# Patient Record
Sex: MALE | Race: OTHER | HISPANIC OR LATINO | URBAN - METROPOLITAN AREA
[De-identification: names, ages, dates, MRNs, and addresses within clinical notes are randomized per-mention and may not be internally consistent; named-entity substitution may affect disease eponyms.]

---

## 2021-12-08 ENCOUNTER — EMERGENCY (EMERGENCY)
Facility: HOSPITAL | Age: 56
LOS: 1 days | Discharge: HOME | End: 2021-12-08
Attending: EMERGENCY MEDICINE
Payer: MEDICAID

## 2021-12-08 VITALS
HEART RATE: 95 BPM | OXYGEN SATURATION: 95 % | DIASTOLIC BLOOD PRESSURE: 86 MMHG | WEIGHT: 190.04 LBS | TEMPERATURE: 98 F | SYSTOLIC BLOOD PRESSURE: 126 MMHG | RESPIRATION RATE: 20 BRPM

## 2021-12-08 DIAGNOSIS — T78.3XXA ANGIONEUROTIC EDEMA, INITIAL ENCOUNTER: ICD-10-CM

## 2021-12-08 LAB
ALBUMIN SERPL ELPH-MCNC: 4.7 G/DL — SIGNIFICANT CHANGE UP (ref 3.3–5)
ALP SERPL-CCNC: 103 U/L — SIGNIFICANT CHANGE UP (ref 40–120)
ALT FLD-CCNC: 17 U/L — SIGNIFICANT CHANGE UP (ref 10–45)
ANION GAP SERPL CALC-SCNC: 11 MMOL/L — SIGNIFICANT CHANGE UP (ref 5–17)
AST SERPL-CCNC: 19 U/L — SIGNIFICANT CHANGE UP (ref 10–40)
BASOPHILS # BLD AUTO: 0.02 K/UL — SIGNIFICANT CHANGE UP (ref 0–0.2)
BASOPHILS NFR BLD AUTO: 0.3 % — SIGNIFICANT CHANGE UP (ref 0–2)
BILIRUB SERPL-MCNC: 0.4 MG/DL — SIGNIFICANT CHANGE UP (ref 0.2–1.2)
BUN SERPL-MCNC: 17 MG/DL — SIGNIFICANT CHANGE UP (ref 7–23)
CALCIUM SERPL-MCNC: 9.3 MG/DL — SIGNIFICANT CHANGE UP (ref 8.4–10.5)
CHLORIDE SERPL-SCNC: 102 MMOL/L — SIGNIFICANT CHANGE UP (ref 96–108)
CO2 SERPL-SCNC: 24 MMOL/L — SIGNIFICANT CHANGE UP (ref 22–31)
CREAT SERPL-MCNC: 1.02 MG/DL — SIGNIFICANT CHANGE UP (ref 0.5–1.3)
EOSINOPHIL # BLD AUTO: 0.06 K/UL — SIGNIFICANT CHANGE UP (ref 0–0.5)
EOSINOPHIL NFR BLD AUTO: 1 % — SIGNIFICANT CHANGE UP (ref 0–6)
GLUCOSE SERPL-MCNC: 116 MG/DL — HIGH (ref 70–99)
HCT VFR BLD CALC: 46.1 % — SIGNIFICANT CHANGE UP (ref 39–50)
HGB BLD-MCNC: 14.8 G/DL — SIGNIFICANT CHANGE UP (ref 13–17)
IMM GRANULOCYTES NFR BLD AUTO: 0.3 % — SIGNIFICANT CHANGE UP (ref 0–1.5)
LYMPHOCYTES # BLD AUTO: 2.33 K/UL — SIGNIFICANT CHANGE UP (ref 1–3.3)
LYMPHOCYTES # BLD AUTO: 40 % — SIGNIFICANT CHANGE UP (ref 13–44)
MCHC RBC-ENTMCNC: 27.7 PG — SIGNIFICANT CHANGE UP (ref 27–34)
MCHC RBC-ENTMCNC: 32.1 GM/DL — SIGNIFICANT CHANGE UP (ref 32–36)
MCV RBC AUTO: 86.3 FL — SIGNIFICANT CHANGE UP (ref 80–100)
MONOCYTES # BLD AUTO: 0.44 K/UL — SIGNIFICANT CHANGE UP (ref 0–0.9)
MONOCYTES NFR BLD AUTO: 7.5 % — SIGNIFICANT CHANGE UP (ref 2–14)
NEUTROPHILS # BLD AUTO: 2.96 K/UL — SIGNIFICANT CHANGE UP (ref 1.8–7.4)
NEUTROPHILS NFR BLD AUTO: 50.9 % — SIGNIFICANT CHANGE UP (ref 43–77)
NRBC # BLD: 0 /100 WBCS — SIGNIFICANT CHANGE UP (ref 0–0)
PLATELET # BLD AUTO: 317 K/UL — SIGNIFICANT CHANGE UP (ref 150–400)
POTASSIUM SERPL-MCNC: 4 MMOL/L — SIGNIFICANT CHANGE UP (ref 3.5–5.3)
POTASSIUM SERPL-SCNC: 4 MMOL/L — SIGNIFICANT CHANGE UP (ref 3.5–5.3)
PROT SERPL-MCNC: 7.9 G/DL — SIGNIFICANT CHANGE UP (ref 6–8.3)
RBC # BLD: 5.34 M/UL — SIGNIFICANT CHANGE UP (ref 4.2–5.8)
RBC # FLD: 14.3 % — SIGNIFICANT CHANGE UP (ref 10.3–14.5)
SARS-COV-2 RNA SPEC QL NAA+PROBE: SIGNIFICANT CHANGE UP
SODIUM SERPL-SCNC: 137 MMOL/L — SIGNIFICANT CHANGE UP (ref 135–145)
WBC # BLD: 5.83 K/UL — SIGNIFICANT CHANGE UP (ref 3.8–10.5)
WBC # FLD AUTO: 5.83 K/UL — SIGNIFICANT CHANGE UP (ref 3.8–10.5)

## 2021-12-08 PROCEDURE — 99284 EMERGENCY DEPT VISIT MOD MDM: CPT | Mod: 25

## 2021-12-08 PROCEDURE — 31575 DIAGNOSTIC LARYNGOSCOPY: CPT

## 2021-12-08 PROCEDURE — 99291 CRITICAL CARE FIRST HOUR: CPT

## 2021-12-08 RX ORDER — DIPHENHYDRAMINE HCL 50 MG
50 CAPSULE ORAL ONCE
Refills: 0 | Status: COMPLETED | OUTPATIENT
Start: 2021-12-08 | End: 2021-12-08

## 2021-12-08 RX ORDER — EPINEPHRINE 0.3 MG/.3ML
0.5 INJECTION INTRAMUSCULAR; SUBCUTANEOUS ONCE
Refills: 0 | Status: COMPLETED | OUTPATIENT
Start: 2021-12-08 | End: 2021-12-08

## 2021-12-08 RX ORDER — FAMOTIDINE 10 MG/ML
20 INJECTION INTRAVENOUS
Refills: 0 | Status: DISCONTINUED | OUTPATIENT
Start: 2021-12-08 | End: 2021-12-08

## 2021-12-08 RX ORDER — DIPHENHYDRAMINE HCL 50 MG
50 CAPSULE ORAL EVERY 6 HOURS
Refills: 0 | Status: DISCONTINUED | OUTPATIENT
Start: 2021-12-08 | End: 2021-12-11

## 2021-12-08 RX ORDER — FAMOTIDINE 10 MG/ML
20 INJECTION INTRAVENOUS
Refills: 0 | Status: DISCONTINUED | OUTPATIENT
Start: 2021-12-08 | End: 2021-12-11

## 2021-12-08 RX ORDER — ACETAMINOPHEN 500 MG
975 TABLET ORAL ONCE
Refills: 0 | Status: COMPLETED | OUTPATIENT
Start: 2021-12-08 | End: 2021-12-08

## 2021-12-08 RX ORDER — EPINEPHRINE 0.3 MG/.3ML
0.3 INJECTION INTRAMUSCULAR; SUBCUTANEOUS
Qty: 2 | Refills: 0
Start: 2021-12-08

## 2021-12-08 RX ORDER — DEXAMETHASONE 0.5 MG/5ML
10 ELIXIR ORAL EVERY 8 HOURS
Refills: 0 | Status: DISCONTINUED | OUTPATIENT
Start: 2021-12-08 | End: 2021-12-09

## 2021-12-08 RX ORDER — DEXAMETHASONE 0.5 MG/5ML
10 ELIXIR ORAL ONCE
Refills: 0 | Status: COMPLETED | OUTPATIENT
Start: 2021-12-08 | End: 2021-12-08

## 2021-12-08 RX ORDER — FAMOTIDINE 10 MG/ML
20 INJECTION INTRAVENOUS ONCE
Refills: 0 | Status: COMPLETED | OUTPATIENT
Start: 2021-12-08 | End: 2021-12-08

## 2021-12-08 RX ADMIN — Medication 975 MILLIGRAM(S): at 19:54

## 2021-12-08 RX ADMIN — EPINEPHRINE 0.5 MILLIGRAM(S): 0.3 INJECTION INTRAMUSCULAR; SUBCUTANEOUS at 10:56

## 2021-12-08 RX ADMIN — FAMOTIDINE 20 MILLIGRAM(S): 10 INJECTION INTRAVENOUS at 23:23

## 2021-12-08 RX ADMIN — FAMOTIDINE 20 MILLIGRAM(S): 10 INJECTION INTRAVENOUS at 10:55

## 2021-12-08 RX ADMIN — Medication 50 MILLIGRAM(S): at 19:55

## 2021-12-08 RX ADMIN — Medication 125 MILLIGRAM(S): at 09:47

## 2021-12-08 RX ADMIN — Medication 102 MILLIGRAM(S): at 23:23

## 2021-12-08 RX ADMIN — EPINEPHRINE 0.5 MILLIGRAM(S): 0.3 INJECTION INTRAMUSCULAR; SUBCUTANEOUS at 10:08

## 2021-12-08 RX ADMIN — Medication 50 MILLIGRAM(S): at 09:47

## 2021-12-08 RX ADMIN — Medication 102 MILLIGRAM(S): at 15:21

## 2021-12-08 RX ADMIN — EPINEPHRINE 0.5 MILLIGRAM(S): 0.3 INJECTION INTRAMUSCULAR; SUBCUTANEOUS at 09:46

## 2021-12-08 RX ADMIN — Medication 975 MILLIGRAM(S): at 20:21

## 2021-12-08 RX ADMIN — Medication 102 MILLIGRAM(S): at 10:45

## 2021-12-08 NOTE — ED CDU PROVIDER INITIAL DAY NOTE - PROGRESS NOTE DETAILS
No interval changes since initial CDU provider note. Pt feels well without complaint. NAD VSS. Patient endorse improvement in symptoms. Able to tolerate water. Lips and tongue does not appear to be swollen. Lung clear to auscultation, abdomen soft/nontender. O2 sat mid 90s. Will continue to monitor closely.. Michelet Lee

## 2021-12-08 NOTE — ED PROVIDER NOTE - PROGRESS NOTE DETAILS
Patient given second dose of IM epi for throat closing and itching.  Will admit patient to CDU for continued monitoring given repeat dose of Epi required.   Aurea Roberts MD dw ent pa, will come to scope pt promptly. -Ricky Giles MD-

## 2021-12-08 NOTE — CONSULT NOTE ADULT - SUBJECTIVE AND OBJECTIVE BOX
CC: throat swelling     HPI: 57 yo M pw allergic reaction that started after drinking some soy milk today. He endorses facial swelling, throat closing and itching, difficulty breathing and hives and itching to palms. Denies any prior anaphylaxis or allergies. Denies fever, wheezing, cough, nausea, vomiting. Patient takes no medications at home.      PAST MEDICAL & SURGICAL HISTORY:  No pertinent past medical history    No significant past surgical history      Allergies    No Known Drug Allergies  Soy (Anaphylaxis)    Intolerances      MEDICATIONS  (STANDING):    MEDICATIONS  (PRN):      Social History: no pertinent social history     Family history: no pertinent family history    ROS:   ENT: all negative except as noted in HPI   CV: denies palpitations  Pulm: denies SOB, cough, hemoptysis  GI: denies change in appetite, indigestion, n/v  : denies pertinent urinary symptoms, urgency  Neuro: denies numbness/tingling, loss of sensation  Psych: denies anxiety  MS: denies muscle weakness, instability  Heme: denies easy bruising or bleeding  Endo: denies heat/cold intolerance, excessive sweating  Vascular: denies LE edema    Vital Signs Last 24 Hrs  T(C): 36.6 (08 Dec 2021 11:36), Max: 36.7 (08 Dec 2021 09:48)  T(F): 97.8 (08 Dec 2021 11:36), Max: 98 (08 Dec 2021 09:48)  HR: 96 (08 Dec 2021 11:36) (95 - 105)  BP: 119/76 (08 Dec 2021 11:36) (119/76 - 140/80)  BP(mean): --  RR: 22 (08 Dec 2021 11:36) (20 - 22)  SpO2: 99% (08 Dec 2021 11:36) (95% - 99%)                          14.8   5.83  )-----------( 317      ( 08 Dec 2021 10:10 )             46.1    12-08    137  |  102  |  17  ----------------------------<  116<H>  4.0   |  24  |  1.02    Ca    9.3      08 Dec 2021 10:10    TPro  7.9  /  Alb  4.7  /  TBili  0.4  /  DBili  x   /  AST  19  /  ALT  17  /  AlkPhos  103  12-08       PHYSICAL EXAM:  Gen: NAD, muffled voice   Skin: No rashes, bruises, or lesions  Head: Normocephalic, Atraumatic  Face: no edema, erythema, or fluctuance. Parotid glands soft without mass  Eyes: no scleral injection  Nose: Nares bilaterally patent, no discharge  Mouth: +significant uvular edema. +soft palate edema, +FOM edema. No Stridor / Drooling / Trismus. Mucosa moist, oropharynx clear  Neck: Flat, supple, no lymphadenopathy, trachea midline, no masses  Lymphatic: No lymphadenopathy  Resp: breathing easily, no stridor  CV: no peripheral edema/cyanosis  GI: nondistended   Peripheral vascular: no JVD or edema  Neuro: facial nerve intact, no facial droop      Fiberoptic Indirect laryngoscopy:  (Scope #2 used)  Reason for Laryngoscopy: angioedema     Patient was unable to cooperate with mirror.  generalized erythema throughout larynx, pooling of secretions in oropharynx, +uvular edema. Nasopharynx, oropharynx, and hypopharynx clear, no bleeding. Tongue base, posterior pharyngeal wall, vallecula, epiglottis, and subglottis appear normal. No masses or lesions. Airway patent, no foreign body visualized. No glottic/supraglottic edema. True vocal cords, arytenoids, vestibular folds, ventricles, pyriform sinuses, and aryepiglottic folds appear normal bilaterally. Vocal cords mobile with good contact b/l.

## 2021-12-08 NOTE — ED CDU PROVIDER INITIAL DAY NOTE - PHYSICAL EXAMINATION
GENERAL: AAOx4, GCS 15, NAD; talking in full clear sentences. pt reports voice sounds "heavy"  HEENT: MMM, + periorbital and lip swelling, EOMI, nonicteric sclera;   PULM: CTAB, no crackles/rubs/rales;   CV: RRR, S1S2, no MRG;   ABD: Flat abdomen, NTND, no R/G/R   MSK: WOOD, +2 pulses x4;    SKIN: + hives to palms bilaterally   NEURO: moving all extremities; sensation intact to bilateral UE and LE

## 2021-12-08 NOTE — ED CDU PROVIDER INITIAL DAY NOTE - ATTENDING CONTRIBUTION TO CARE
ATTENDING, Chan SUAREZ: I have personally performed a face to face diagnostic evaluation on this patient.  I have reviewed the ACP note and agree with the history, exam, and plan of care, except as noted here. Progress notes and further evaluation to be reviewed by observation and discharging attending.

## 2021-12-08 NOTE — ED CDU PROVIDER INITIAL DAY NOTE - OBJECTIVE STATEMENT
56yom no pmhx on no meds, No etoh, drug use, c/o allergic reaction that started after drinking some soy milk today around 745am then rash and throat closing sensation around 830ish. He endorses facial swelling, throat closing and itching, difficulty breathing and hives and itching to palms. Denies any prior anaphylaxis or allergies. Denies fever, wheezing, cough, nausea, vomiting. Patient takes no medications at home.

## 2021-12-08 NOTE — ED PROVIDER NOTE - PHYSICAL EXAMINATION
GENERAL: AAOx4, GCS 15, NAD, WDWN;   HEENT: MMM, + periorbital and lip swelling, EOMI, nonicteric sclera;   PULM: CTAB, no crackles/rubs/rales;   CV: RRR, S1S2, no MRG;   ABD: Flat abdomen, NTND, no R/G/R   MSK: WOOD, +2 pulses x4;    SKIN: + hives to palms bilaterally   NEURO: moving all extremities; sensation intact to bilateral UE and LE

## 2021-12-08 NOTE — CONSULT NOTE ADULT - ASSESSMENT
55 yo M pw allergic reaction that started after drinking some soy milk today. He endorses facial swelling, throat closing and itching, difficulty breathing and hives and itching to palms. On exam, significant uvular and soft palate edema noted. Indirect laryngoscopy performed which showed uvular edema consistent with exam however no laryngeal edema noted. Repeat scope 45 min later showed mild improvement.

## 2021-12-08 NOTE — CONSULT NOTE ADULT - ATTENDING COMMENTS
Pt with soft palate significant angioedema progressed over 1.5 hours with slight improvement subjectively since meds on first exam   scope showed clear airway and no other angioedema   serial exam/scope 45 min later showed no additional edema and subjective improvement in sx. will continue to monitor closely, low threshold to securer airway if sx or exam gets worse - discussed with pt  decadron, benadryl, pepcid  close monitoring, let us know if any change

## 2021-12-08 NOTE — CHART NOTE - NSCHARTNOTEFT_GEN_A_CORE
Pt seen and examined at bedside. Pt eating and tolerating regular diet. Pt didn't  have stridor, on RA, no SOB, hoarseness, and improvement of voice and swelling. He is able to swallowing with minimal difficulty, no drooling noted, able to handle secretions. On exam, improvement of soft palate and uvular edema. Also noted improvement of voice on exam.     Plan:  - continue decadron 10mg q8h x 24 hours  - continue benadryl and pepcid  - Monitor for signs of airway compromise, call ENT for worsening swelling or stridor  - ENT will continue to follow.    # [id number]  ENT PA.
Pt seen and examined at bedside. Pt eating and tolerating regular diet. Pt states improvement of voice and swelling. He is able to swallowing with minimal difficulty. On exam, improvement of soft palate and uvular edema. Also noted improvement of voice on exam.    Plan:  - continue decadron 10mg q8h x 24 hours  - continue benadryl and pepcid  - Monitor for signs of airway compromise, call ENT for worsening swelling or stridor  - ENT will continue to follow

## 2021-12-08 NOTE — CONSULT NOTE ADULT - PROBLEM SELECTOR RECOMMENDATION 9
- Decadron 10mg q8h x24 hours  - Benedryl  - Pepcid   - Airway monitoring  - CDU vs admission depending on improvement of edema   - Plan to rescope   - ENT will continue to follow  - Call with questions or concerns

## 2021-12-08 NOTE — ED ADULT NURSE NOTE - OBJECTIVE STATEMENT
57 y/o male from home coming to the ED for trouble breathing, throat swelling and itching after drinking soy milk this morning. Pt denies PMH, states he is allergic to some fruits, never had an anaphylactic reaction from soy milk previously, pt denies CP, abd pain fever/chills/n/v/d,  Pt is A&Ox4, some labored breathing and SOB noted by RN, pt abd soft no tender, skin warm dry and intact.  Tongue swelling noted, will continue to monitor, pt given call bell, safety and comfort provided.

## 2021-12-08 NOTE — ED PROVIDER NOTE - OBJECTIVE STATEMENT
57 yo M pw allergic reaction that started after drinking some soy milk today. He endorses facial swelling, throat closing and itching, difficulty breathing and hives and itching to palms. Denies any prior anaphylaxis or allergies. Denies fever, wheezing, cough, nausea, vomiting. Patient takes no medications at home.

## 2021-12-08 NOTE — ED PROVIDER NOTE - ATTENDING CONTRIBUTION TO CARE
Evaluating for critical conditions noted above. Ordering tests. Reviewing results. Ordering medications as noted in MAR. Discussions with consultant.     sob / throat closing / itch from poss soy milk. no hx of such. rash to friuts.  periorbital edema, muffled voice  epi / steriods / re-eval.

## 2021-12-09 VITALS
TEMPERATURE: 98 F | HEART RATE: 46 BPM | RESPIRATION RATE: 20 BRPM | SYSTOLIC BLOOD PRESSURE: 118 MMHG | OXYGEN SATURATION: 97 % | DIASTOLIC BLOOD PRESSURE: 62 MMHG

## 2021-12-09 DIAGNOSIS — G47.33 OBSTRUCTIVE SLEEP APNEA (ADULT) (PEDIATRIC): ICD-10-CM

## 2021-12-09 PROCEDURE — 85025 COMPLETE CBC W/AUTO DIFF WBC: CPT

## 2021-12-09 PROCEDURE — U0003: CPT

## 2021-12-09 PROCEDURE — 96376 TX/PRO/DX INJ SAME DRUG ADON: CPT

## 2021-12-09 PROCEDURE — 80053 COMPREHEN METABOLIC PANEL: CPT

## 2021-12-09 PROCEDURE — G0378: CPT

## 2021-12-09 PROCEDURE — 96372 THER/PROPH/DIAG INJ SC/IM: CPT | Mod: XU

## 2021-12-09 PROCEDURE — U0005: CPT

## 2021-12-09 PROCEDURE — 96374 THER/PROPH/DIAG INJ IV PUSH: CPT | Mod: XU

## 2021-12-09 PROCEDURE — 96375 TX/PRO/DX INJ NEW DRUG ADDON: CPT | Mod: XU

## 2021-12-09 PROCEDURE — 31505 DIAGNOSTIC LARYNGOSCOPY: CPT

## 2021-12-09 PROCEDURE — 99291 CRITICAL CARE FIRST HOUR: CPT | Mod: 25

## 2021-12-09 RX ORDER — KETOROLAC TROMETHAMINE 30 MG/ML
15 SYRINGE (ML) INJECTION ONCE
Refills: 0 | Status: DISCONTINUED | OUTPATIENT
Start: 2021-12-09 | End: 2021-12-09

## 2021-12-09 RX ADMIN — Medication 50 MILLIGRAM(S): at 03:32

## 2021-12-09 RX ADMIN — Medication 15 MILLIGRAM(S): at 05:41

## 2021-12-09 NOTE — ED CDU PROVIDER SUBSEQUENT DAY NOTE - PROGRESS NOTE DETAILS
O2 sat while sleeping fluctuates from mid 90s to mid-high 80s. Patient was sleeping on stomach at the time of desat. Denies SOB. No signs of acute respiratory distress. No lip/tongue/facial swelling. Placed on 1L NC. Unsure if there is a component of undiagnosed sleep apnea or positioning. Will monitor closely. - Shruti Lee PA-C O2 sat while sleeping fluctuates from mid 90s to mid-high 80s. Patient was sleeping on stomach at the time of desat. Denies SOB. No signs of acute respiratory distress, patient speaking in complete sentences. Without complaints. No lip/tongue/facial swelling. Placed on 1L NC. Unsure if there is a component of undiagnosed sleep apnea or positioning. Will monitor closely. - Shruti Lee PA-C ENT at bedside, states that patient can be discharged home from their perspective to follow up with allergist. States that patient can also follow up with Dr. Stauffer. Would recommend discharge home with medrol dose pack. - Shruti Lee PA-C Pt seen at bedside in NAD, resting and breathing comfortably w/o new or evolving complaints. States feeling much better. Tolerating secretions. VSS. DEMARCO overnight. SpO2 now 98 on RA. ENT at bedside, cleared pt for DC. Pharmacy confirmed - will send steroid dose pack and epi pen. Pt understands to f/u pmd, ENT, pulmonology, allergy in 1 month.

## 2021-12-09 NOTE — ED CDU PROVIDER SUBSEQUENT DAY NOTE - HISTORY
No interval changes since initial CDU provider note. Pt without complaint. NAD VSS. no events on tele. Plan to continue to closely monitor symptoms in the setting of angioedema, resolving. ENT following. - ORIANA Lee

## 2021-12-09 NOTE — ED ADULT NURSE REASSESSMENT NOTE - NS ED NURSE REASSESS COMMENT FT1
07.00 Am Received the Pt from  JHONATHAN Ashby . Pt is Observed for Allergic reaction. Received the Pt A&OX 4 obeys commands Devi N/V/D fever chills cp SOB  Pt not in respiratory distress   Comfort care & safety measures continued  IV site looks clean & dry no signs of infiltration noted pt denies  pain IV site .  Pt is advised to call for help  call bell with in the reach pt verbalized the understanding .   pending CDU  MD west . GCS 15/15 A&OX 4 PERRLA  size 3 Strong upper & lower extremities steady gait   No facial droop  No Hand Leg drop denies numbness tingling Continue to monitor  09.32 Pt is evaluated by CDU MD Fatoumata Vazquez . pt is feeling better.  Pt is discharged . Ml bridget Power  explained the follow up care & gave the discharge summary  . Pt has stable vitals steady gait A&OX 4 at the time of Discharge
AAO3, NAD, RR even and unlabored, airway patent. Speaking clear in full sentences. CM ongoing
Assumed care of pt from RN Evelyn, AAO3, NAD. RR even and unlabored speaking clear in full sentences. Reports that throat still feels slightly tight but has greatly improved since arrival to ED
Pt received from JHONATHAN Martinez. Pt oriented to CDU & plan of care was discussed. Pt states he is feeling much better than previously. Pt denies any SOB, difficulty breathing, difficulty swallowing. Pt states all symptoms have resolved. Pt able to tolerate PO without difficulty. Pt placed on continuous pulse ox sating >95% on room air. Safety & comfort measures maintained. Call bell in reach. Will continue to monitor.

## 2021-12-09 NOTE — ED CDU PROVIDER DISPOSITION NOTE - CLINICAL COURSE
55 yo M pw allergic reaction that started after drinking some soy milk today. He endorses facial swelling, throat closing and itching, difficulty breathing and hives and itching to palms. Denies any prior anaphylaxis or allergies. Denies fever, wheezing, cough, nausea, vomiting. Patient takes no medications at home.  ED course: Patient given multiple rounds of Epi and IV steroids with improvement in symptoms. ENT consulted. Plan for close monitoring, Decadron/Benadryl/Pepcid for angioedema in CDU. 55 yo M pw allergic reaction that started after drinking some soy milk today. He endorses facial swelling, throat closing and itching, difficulty breathing and hives and itching to palms. Denies any prior anaphylaxis or allergies. Denies fever, wheezing, cough, nausea, vomiting. Patient takes no medications at home.  ED course: Patient given multiple rounds of Epi and IV steroids with improvement in symptoms. ENT consulted. Plan for close monitoring, Decadron/Benadryl/Pepcid for angioedema in CDU.  In CDU, Pt seen at bedside in NAD, resting and breathing comfortably w/o new or evolving complaints. States feeling much better. Tolerating secretions. VSS. DEMARCO overnight. SpO2 now 98 on RA. ENT at bedside, cleared pt for DC. Pharmacy confirmed - will send steroid dose pack and epi pen. Pt understands to f/u pmd, ENT, pulmonology, allergy in 1 month.

## 2021-12-09 NOTE — PROGRESS NOTE ADULT - PROBLEM SELECTOR PLAN 1
-DC w medrol dosepak  -Pt to return should edema return, difficulty breathing speaking or swallowing

## 2021-12-09 NOTE — ED CDU PROVIDER SUBSEQUENT DAY NOTE - ATTENDING CONTRIBUTION TO CARE
Pt observed overnight for angioedema.  Had some drop in sats improved with repositioning and oxygen 1L, now awake this morning with normal sats on room air.  Swelling improved, normal voice, no wheezing.  Advised to f/u sleep study, ENT, and allergist (1mo).  STable for dc home.

## 2021-12-09 NOTE — ED CDU PROVIDER DISPOSITION NOTE - NSFOLLOWUPINSTRUCTIONS_ED_ALL_ED_FT
Hydrate.     You have been prescribed an Epi-Pen, please read the attached information on use.     You may be contacted by our Emergency Department Referrals Coordinator to set up your follow up appointment within 24-48 hours of your discharge Monday- Friday: Allergy/Immunology. We recommend you follow up with your primary care provider within the next 2-3 days, please bring all of your results with you.     Please return to the Emergency Department with new, worsening, or concerning symptoms, such as:  -Shortness of breath or trouble breathing  -Pressure, pain, tightness in chest  -Facial drooping, arm weakness, or speech difficulty   -Head injury or loss of consciousness   -Nonstop bleeding or an open wound     *More detailed information regarding your visit and discharge can be found by reviewing this packet 1) Follow-up with your primary care provider in 1-2 days.        Follow-up with ENT within 1-2 weeks. Call for appointment.    Dakota Stauffer)  Otolaryngology  [address], [address]  [place] [address]  Phone: [phone]  Fax: [phone]        Follow-up with [facility] within 1-2 weeks. Call for appointment.    [facility]  [address], [place] [address]  Phone: [phone]        Follow-up with Allergy and Immunology in 4 weeks. Call for appointment.    [facility] Allergy and Immunology  Allergy  [address] [place] [address]  Phone: [phone]    2) Take Prednisone as prescribed. Use EpiPen if you experience severe allergic reaction with difficultly breathing. Continue to take all medications as prescribed.    3) Rest and drink plenty of fluids. Pain can be managed with Acetaminophen (aka Tylenol) and Ibuprofen (aka Motrin or Advil) over the counter as directed. Take with food.    4) Return to the ER for any new or worsening symptoms.

## 2021-12-09 NOTE — ED CDU PROVIDER DISPOSITION NOTE - PATIENT PORTAL LINK FT
You can access the FollowMyHealth Patient Portal offered by [facility] by registering at the following website: [email or web address]. By joining [facility]’s FollowMyHealth portal, you will also be able to view your health information using other applications (apps) compatible with our system.

## 2021-12-09 NOTE — ED CDU PROVIDER SUBSEQUENT DAY NOTE - NS ED ROS FT
Constitutional: No fever or chills  Eyes: No visual changes, eye pain   Face: +facial swelling +throat closing sensation   CV: No chest pain or lower extremity edema  Resp: + SOB no cough  GI: No abd pain. No nausea or vomiting. No diarrhea.   : No dysuria, hematuria.   MSK: No musculoskeletal pain  Skin: +hives   Psych: No complaints   Neuro: No headache. No numbness or tingling. No weakness.  Endo: No DM

## 2021-12-09 NOTE — ED CDU PROVIDER DISPOSITION NOTE - NSFOLLOWUPCLINICS_GEN_ALL_ED_FT
[facility] Allergy and Immunology  Allergy  [address] [place] [address]  Phone: [phone]  Fax:

## 2021-12-09 NOTE — PROGRESS NOTE ADULT - PROBLEM SELECTOR PLAN 2
-Pt should followup with ENT Dr. Stauffer call [phone]  Pt will need proper sleep study for definitive diagnosis

## 2021-12-09 NOTE — PROGRESS NOTE ADULT - SUBJECTIVE AND OBJECTIVE BOX
ENT ISSUE/POD: angioedema    HPI: pt s/p overnight obs w decadron benadryl and pepcid for soft palate and uvular edema. no laryngeal involvement on scope. pt reports overall improvement this am able to tolerate regular diet  Pt put on O2 for slight intermittent desaturations overnight with snoring likely DEMARCO      PAST MEDICAL & SURGICAL HISTORY:  No pertinent past medical history    No significant past surgical history      Allergies    No Known Drug Allergies  Soy (Anaphylaxis)    Intolerances      MEDICATIONS  (STANDING):  diphenhydrAMINE Injectable 50 milliGRAM(s) IV Push every 6 hours  famotidine Injectable 20 milliGRAM(s) IV Push two times a day    MEDICATIONS  (PRN):      ROS:   ENT: all negative except as noted in HPI   Pulm: denies SOB, cough, hemoptysis  Neuro: denies numbness/tingling, loss of sensation  Endo: denies heat/cold intolerance, excessive sweating      Vital Signs Last 24 Hrs  T(C): 36.7 (09 Dec 2021 04:19), Max: 36.7 (08 Dec 2021 09:48)  T(F): 98 (09 Dec 2021 04:19), Max: 98.1 (08 Dec 2021 12:46)  HR: 49 (09 Dec 2021 04:19) (49 - 108)  BP: 113/72 (09 Dec 2021 04:19) (103/67 - 140/80)  BP(mean): --  RR: 20 (09 Dec 2021 04:19) (16 - 22)  SpO2: 97% (09 Dec 2021 04:19) (95% - 99%)                          14.8   5.83  )-----------( 317      ( 08 Dec 2021 10:10 )             46.1    12-08    137  |  102  |  17  ----------------------------<  116<H>  4.0   |  24  |  1.02    Ca    9.3      08 Dec 2021 10:10    TPro  7.9  /  Alb  4.7  /  TBili  0.4  /  DBili  x   /  AST  19  /  ALT  17  /  AlkPhos  103  12-08       PHYSICAL EXAM:  Gen: NAD  Skin: No rashes, bruises, or lesions  Head: Normocephalic, Atraumatic  Face: no edema, erythema, or fluctuance. Parotid glands soft without mass  Eyes: no scleral injection  Nose: Nares bilaterally patent, no discharge  Mouth: No Stridor / Drooling / Trismus.  Mucosa moist, tongue/uvula midline, oropharynx clear, no edema  Neck: Flat, supple, no lymphadenopathy, trachea midline, no masses  Lymphatic: No lymphadenopathy  Resp: breathing easily, no stridor  Neuro: facial nerve intact, no facial droop

## 2021-12-09 NOTE — PROGRESS NOTE ADULT - ASSESSMENT
57 yo male w acute episode of angioedema w improvement after steroids benadryl and pepcid. Pt also likely w DEMARCO as he had intermittent desats with snoring episodes whilst sleeping in obs

## 2021-12-09 NOTE — ED CDU PROVIDER SUBSEQUENT DAY NOTE - PHYSICAL EXAMINATION
GEN: Well Appearing, Nontoxic, NAD  HEENT: NC/AT, Symm Facies. no tongue/lip swelling, posterior pharynx clear  CV: No JVD/Bruits or stridor;  +S1S2, RRR w/o m/g/r  RESP: CTAB w/o w/r/r  ABD: Soft, nt/nd, +BS. No guarding/rebound  EXT/MSK: No lower extremity edema or calf tenderness. FROMx4  SKIN: No visible erythema, lesions or rash  Neuro: Grossly intact, AOX3 with normal speech  Psych: Appropriate mood and affect

## 2021-12-09 NOTE — ED CDU PROVIDER SUBSEQUENT DAY NOTE - MEDICAL DECISION MAKING DETAILS
Dr. Ham Note: allergic angioedema and likely DEMARCO, improved with steroids, serial exams, ENT consultation

## 2021-12-09 NOTE — ED CDU PROVIDER DISPOSITION NOTE - NSPTACCESSSVCSAPPTDETAILS_ED_ALL_ED_FT
urgent   angioedema URGENT ent f/u with Dr. Stauffre for angioedema  Please also assist pt with appointment for Sleep Center for Sleep Study for DEMARCO and Allergy/Immunology appointment in 4 weeks.  Thanks!

## 2021-12-13 PROBLEM — Z78.9 OTHER SPECIFIED HEALTH STATUS: Chronic | Status: ACTIVE | Noted: 2021-12-08

## 2021-12-14 PROBLEM — Z00.00 ENCOUNTER FOR PREVENTIVE HEALTH EXAMINATION: Status: ACTIVE | Noted: 2021-12-14

## 2021-12-21 ENCOUNTER — INPATIENT (INPATIENT)
Facility: HOSPITAL | Age: 56
LOS: 2 days | Discharge: HOME | DRG: 564 | End: 2021-12-24
Attending: NEUROLOGICAL SURGERY | Admitting: NEUROLOGICAL SURGERY
Payer: MEDICAID

## 2021-12-21 VITALS
HEIGHT: 65 IN | HEART RATE: 67 BPM | OXYGEN SATURATION: 98 % | WEIGHT: 179.9 LBS | SYSTOLIC BLOOD PRESSURE: 120 MMHG | TEMPERATURE: 97 F | DIASTOLIC BLOOD PRESSURE: 77 MMHG | RESPIRATION RATE: 20 BRPM

## 2021-12-21 DIAGNOSIS — I60.9 NONTRAUMATIC SUBARACHNOID HEMORRHAGE, UNSPECIFIED: ICD-10-CM

## 2021-12-21 LAB
ALBUMIN SERPL ELPH-MCNC: 4.1 G/DL — SIGNIFICANT CHANGE UP (ref 3.3–5.2)
ALP SERPL-CCNC: 93 U/L — SIGNIFICANT CHANGE UP (ref 40–120)
ALT FLD-CCNC: 16 U/L — SIGNIFICANT CHANGE UP
ANION GAP SERPL CALC-SCNC: 13 MMOL/L — SIGNIFICANT CHANGE UP (ref 5–17)
APTT BLD: 26.4 SEC — LOW (ref 27.5–35.5)
AST SERPL-CCNC: 16 U/L — SIGNIFICANT CHANGE UP
BASOPHILS # BLD AUTO: 0.04 K/UL — SIGNIFICANT CHANGE UP (ref 0–0.2)
BASOPHILS NFR BLD AUTO: 0.5 % — SIGNIFICANT CHANGE UP (ref 0–2)
BILIRUB SERPL-MCNC: 0.3 MG/DL — LOW (ref 0.4–2)
BLD GP AB SCN SERPL QL: SIGNIFICANT CHANGE UP
BUN SERPL-MCNC: 19.8 MG/DL — SIGNIFICANT CHANGE UP (ref 8–20)
CALCIUM SERPL-MCNC: 8.5 MG/DL — LOW (ref 8.6–10.2)
CHLORIDE SERPL-SCNC: 101 MMOL/L — SIGNIFICANT CHANGE UP (ref 98–107)
CO2 SERPL-SCNC: 23 MMOL/L — SIGNIFICANT CHANGE UP (ref 22–29)
CREAT SERPL-MCNC: 1.12 MG/DL — SIGNIFICANT CHANGE UP (ref 0.5–1.3)
EOSINOPHIL # BLD AUTO: 0.13 K/UL — SIGNIFICANT CHANGE UP (ref 0–0.5)
EOSINOPHIL NFR BLD AUTO: 1.6 % — SIGNIFICANT CHANGE UP (ref 0–6)
GLUCOSE BLDC GLUCOMTR-MCNC: 164 MG/DL — HIGH (ref 70–99)
GLUCOSE SERPL-MCNC: 155 MG/DL — HIGH (ref 70–99)
HCT VFR BLD CALC: 42.1 % — SIGNIFICANT CHANGE UP (ref 39–50)
HGB BLD-MCNC: 13.5 G/DL — SIGNIFICANT CHANGE UP (ref 13–17)
IMM GRANULOCYTES NFR BLD AUTO: 0.6 % — SIGNIFICANT CHANGE UP (ref 0–1.5)
INR BLD: 1.05 RATIO — SIGNIFICANT CHANGE UP (ref 0.88–1.16)
LYMPHOCYTES # BLD AUTO: 3.24 K/UL — SIGNIFICANT CHANGE UP (ref 1–3.3)
LYMPHOCYTES # BLD AUTO: 39.1 % — SIGNIFICANT CHANGE UP (ref 13–44)
MCHC RBC-ENTMCNC: 27.6 PG — SIGNIFICANT CHANGE UP (ref 27–34)
MCHC RBC-ENTMCNC: 32.1 GM/DL — SIGNIFICANT CHANGE UP (ref 32–36)
MCV RBC AUTO: 85.9 FL — SIGNIFICANT CHANGE UP (ref 80–100)
MONOCYTES # BLD AUTO: 0.55 K/UL — SIGNIFICANT CHANGE UP (ref 0–0.9)
MONOCYTES NFR BLD AUTO: 6.6 % — SIGNIFICANT CHANGE UP (ref 2–14)
NEUTROPHILS # BLD AUTO: 4.28 K/UL — SIGNIFICANT CHANGE UP (ref 1.8–7.4)
NEUTROPHILS NFR BLD AUTO: 51.6 % — SIGNIFICANT CHANGE UP (ref 43–77)
PLATELET # BLD AUTO: 254 K/UL — SIGNIFICANT CHANGE UP (ref 150–400)
POTASSIUM SERPL-MCNC: 3.9 MMOL/L — SIGNIFICANT CHANGE UP (ref 3.5–5.3)
POTASSIUM SERPL-SCNC: 3.9 MMOL/L — SIGNIFICANT CHANGE UP (ref 3.5–5.3)
PROT SERPL-MCNC: 7 G/DL — SIGNIFICANT CHANGE UP (ref 6.6–8.7)
PROTHROM AB SERPL-ACNC: 12.2 SEC — SIGNIFICANT CHANGE UP (ref 10.6–13.6)
RBC # BLD: 4.9 M/UL — SIGNIFICANT CHANGE UP (ref 4.2–5.8)
RBC # FLD: 14.2 % — SIGNIFICANT CHANGE UP (ref 10.3–14.5)
SARS-COV-2 RNA SPEC QL NAA+PROBE: SIGNIFICANT CHANGE UP
SODIUM SERPL-SCNC: 137 MMOL/L — SIGNIFICANT CHANGE UP (ref 135–145)
WBC # BLD: 8.29 K/UL — SIGNIFICANT CHANGE UP (ref 3.8–10.5)
WBC # FLD AUTO: 8.29 K/UL — SIGNIFICANT CHANGE UP (ref 3.8–10.5)

## 2021-12-21 PROCEDURE — 99291 CRITICAL CARE FIRST HOUR: CPT

## 2021-12-21 PROCEDURE — G1004: CPT

## 2021-12-21 PROCEDURE — 70496 CT ANGIOGRAPHY HEAD: CPT | Mod: 26

## 2021-12-21 PROCEDURE — 93010 ELECTROCARDIOGRAM REPORT: CPT

## 2021-12-21 PROCEDURE — 99231 SBSQ HOSP IP/OBS SF/LOW 25: CPT

## 2021-12-21 PROCEDURE — 70498 CT ANGIOGRAPHY NECK: CPT | Mod: 26

## 2021-12-21 PROCEDURE — 70450 CT HEAD/BRAIN W/O DYE: CPT | Mod: 26,MG

## 2021-12-21 PROCEDURE — 99221 1ST HOSP IP/OBS SF/LOW 40: CPT

## 2021-12-21 RX ORDER — LEVETIRACETAM 250 MG/1
500 TABLET, FILM COATED ORAL
Refills: 0 | Status: DISCONTINUED | OUTPATIENT
Start: 2021-12-21 | End: 2021-12-24

## 2021-12-21 RX ORDER — LABETALOL HCL 100 MG
10 TABLET ORAL
Refills: 0 | Status: DISCONTINUED | OUTPATIENT
Start: 2021-12-21 | End: 2021-12-24

## 2021-12-21 RX ORDER — ONDANSETRON 8 MG/1
4 TABLET, FILM COATED ORAL ONCE
Refills: 0 | Status: COMPLETED | OUTPATIENT
Start: 2021-12-21 | End: 2021-12-21

## 2021-12-21 RX ORDER — ONDANSETRON 8 MG/1
4 TABLET, FILM COATED ORAL EVERY 6 HOURS
Refills: 0 | Status: DISCONTINUED | OUTPATIENT
Start: 2021-12-21 | End: 2021-12-24

## 2021-12-21 RX ORDER — ACETAMINOPHEN 500 MG
650 TABLET ORAL EVERY 6 HOURS
Refills: 0 | Status: DISCONTINUED | OUTPATIENT
Start: 2021-12-21 | End: 2021-12-24

## 2021-12-21 RX ORDER — HYDRALAZINE HCL 50 MG
10 TABLET ORAL
Refills: 0 | Status: DISCONTINUED | OUTPATIENT
Start: 2021-12-21 | End: 2021-12-24

## 2021-12-21 RX ORDER — SENNA PLUS 8.6 MG/1
2 TABLET ORAL AT BEDTIME
Refills: 0 | Status: DISCONTINUED | OUTPATIENT
Start: 2021-12-21 | End: 2021-12-24

## 2021-12-21 RX ORDER — OXYCODONE HYDROCHLORIDE 5 MG/1
5 TABLET ORAL EVERY 4 HOURS
Refills: 0 | Status: DISCONTINUED | OUTPATIENT
Start: 2021-12-21 | End: 2021-12-24

## 2021-12-21 RX ORDER — METOCLOPRAMIDE HCL 10 MG
10 TABLET ORAL ONCE
Refills: 0 | Status: COMPLETED | OUTPATIENT
Start: 2021-12-21 | End: 2021-12-21

## 2021-12-21 RX ORDER — ACETAMINOPHEN 500 MG
975 TABLET ORAL ONCE
Refills: 0 | Status: COMPLETED | OUTPATIENT
Start: 2021-12-21 | End: 2021-12-21

## 2021-12-21 RX ORDER — OXYCODONE HYDROCHLORIDE 5 MG/1
10 TABLET ORAL EVERY 4 HOURS
Refills: 0 | Status: DISCONTINUED | OUTPATIENT
Start: 2021-12-21 | End: 2021-12-24

## 2021-12-21 RX ORDER — METOCLOPRAMIDE HCL 10 MG
10 TABLET ORAL ONCE
Refills: 0 | Status: DISCONTINUED | OUTPATIENT
Start: 2021-12-21 | End: 2021-12-21

## 2021-12-21 RX ADMIN — ONDANSETRON 4 MILLIGRAM(S): 8 TABLET, FILM COATED ORAL at 12:09

## 2021-12-21 RX ADMIN — Medication 10 MILLIGRAM(S): at 15:40

## 2021-12-21 RX ADMIN — Medication 650 MILLIGRAM(S): at 22:41

## 2021-12-21 RX ADMIN — Medication 975 MILLIGRAM(S): at 12:39

## 2021-12-21 RX ADMIN — Medication 975 MILLIGRAM(S): at 13:30

## 2021-12-21 RX ADMIN — LEVETIRACETAM 500 MILLIGRAM(S): 250 TABLET, FILM COATED ORAL at 17:33

## 2021-12-21 RX ADMIN — Medication 650 MILLIGRAM(S): at 21:41

## 2021-12-21 NOTE — H&P ADULT - ASSESSMENT
56 year old male w/ no PMHX presents to ED s/p fall off back of truck at work onto head, +LOC, +N/V, -AC/AP. CTH found calvarial fracture, trace tSAH/SDH and EDH.         Plan  - Trauma cleared  - Imaging reviewed w/ attending  - Admit to NSICU  - Q1 neuro checks  - Keppra 500 BID  - Pain control; avoid oversedation  - Normotensive; Labetalol/Hydralazine PRN  - Regular diet; anaphylaxis to Soy/Apples/Oranges  - Zofran PRN  - Senna  - Repeat CTH in 6 hours along with CTA/CTV Head/Neck  - PT/OT eval  - b/l SCDs, no AC/AP/chemical DVT prophylaxis at this time  - Medical management/supportive care per NSICU  - D/w Dr. Tracy

## 2021-12-21 NOTE — ED PROVIDER NOTE - CLINICAL SUMMARY MEDICAL DECISION MAKING FREE TEXT BOX
57 yo male s/p mechanical fall. +LOC. No anticoagulants. Neuro intact. Vitally stable. Will CT head. 55 yo male s/p mechanical fall. +LOC. +N/V. No anticoagulants. Neuro intact. Vitally stable. Will CT head.

## 2021-12-21 NOTE — ED ADULT NURSE REASSESSMENT NOTE - NEURO ASSESSMENT
Caller: Candy Hernandez    Relationship: Self    Best call back number:[phone]    Medication needed:   Requested Prescriptions     Pending Prescriptions Disp Refills   • albuterol sulfate  (90 Base) MCG/ACT inhaler 8.5 g 1     Si puffs Every 4 (Four) Hours As Needed for Wheezing or Shortness of Air.       When do you need the refill by: 12/3/2020    Does the patient have less than a 3 day supply:  [x] Yes  [] No    What is the patient's preferred pharmacy: [id number] [address] - [phone] [facility] [phone] FX     
RX already submitted on 12/2/2020  
- - -

## 2021-12-21 NOTE — ED PROVIDER NOTE - OBJECTIVE STATEMENT
55 yo male with hx of presents to ED s/p fall. Patient fell from about 5 feet high, Hit head, +LOC. Endorses head pain and bump where he hit head. Denies other injury. Denies sensation loss, weakness, N/V, neck pain. No intoxication. 55 yo male with hx of presents to ED s/p fall. Patient fell from about 5 feet high, Hit head, +LOC. Endorses head pain and bump where he hit head. Endorses N/V Denies other injury. Denies sensation loss, weakness, neck pain. No intoxication. 57 yo male with hx of presents to ED s/p fall. Patient fell from about 5 feet high, Hit head, +LOC. Endorses head pain and bump where he hit head. Endorses N/V Denies other injury. Denies sensation loss, weakness, neck pain. No intoxication.  pe awake alert in nad heent

## 2021-12-21 NOTE — H&P ADULT - NSHPPHYSICALEXAM_GEN_ALL_CORE
PHYSICAL EXAM:  GENERAL: NAD, well-groomed, well-developed, Welsh speaking  HEAD: R frontal hematoma   SHORTY COMA SCORE: E-4 V-5 M-6 = 15  MENTAL STATUS: AAO x3; Awake; Opens eyes spontaneously; Appropriately conversant without aphasia; fluent in Welsh, following 2 step commands.  CRANIAL NERVES: Visual acuity normal for age, visual fields full to confrontation, PERRL. EOMI without nystagmus. Facial sensation intact V1-3 distribution b/l. Face symmetric w/ normal eye closure and smile, tongue midline. Hearing grossly intact. Speech clear.   MOTOR: strength 5/5 b/l upper and lower extremities; no drift  SENSATION: grossly intact to light touch all extremities  COORDINATION: No upper extremity dysmetria b/l  SPINE: Nontender to palpation  CHEST/LUNG: Clear to auscultation bilaterally  HEART: +S1/+S2  ABDOMEN: Soft, nontender, nondistended  SKIN: Warm, dry

## 2021-12-21 NOTE — H&P ADULT - NSHPLABSRESULTS_GEN_ALL_CORE
CT Head No Cont (12.21.21 @ 11:33)   IMPRESSION:  1. Trace amounts of acute subarachnoid hemorrhage within the bifrontal   sulci.  2. Trace subdural hemorrhage along the bifrontal convexities and also   along the anterior falx and right anterior tentorial leaflet. No   associated mass effect, shift of the midline structures, or herniation.  3. Small vertex epidural hematoma. Associated curvilinear nondisplaced   right frontal calvarial fracture tracking superiorly into the sagittal   suture. Overlying scalp soft tissue swelling. No shift of the midline   structures or herniation.  4. Chronic left sided maxillary sinusitis. A superimposed lesion inside   of the left maxillary sinus is difficult to exclude given calcified   material.  Dr. Vasquez Joshi discussed findings with Dr. Bo on 12/21/2021 at 11:43 AM  --- End of Report ---

## 2021-12-21 NOTE — CONSULT NOTE ADULT - ASSESSMENT
ASSESSMENT: Patient is a 56y old m s/p fall from height of 3-4ft with trace SDH, SAH, vertex epidural hematoma, and calvarial fracture. No other injuries identified on exam.     PLAN:    - Cleared from Trauma Surgery standpoint   - Recommend NSICU admission, repeat CT head   - C-spine cleared on confrontational exam  - Patient seen/examined with attending.  - Plan discussed with Attending, Dr. Martin

## 2021-12-21 NOTE — ED PROVIDER NOTE - CARE PLAN
1 Principal Discharge DX:	Minor head trauma   Principal Discharge DX:	Subarachnoid hemorrhage  Secondary Diagnosis:	Subdural hematoma  Secondary Diagnosis:	Epidural hematoma

## 2021-12-21 NOTE — CONSULT NOTE ADULT - ATTENDING COMMENTS
56 year old male with unremarkable PMH, presents with mild TBI - nondisplaced R calvarial fracture, trace tSAH/SDH and EDH. S/p mechanical fall.  Cleared by Trauma. Admitted to NCCU.  Neurochecks, stability CTh and CTA - pending.  Maintain normotension.  NPO for now.  pain control, avoid oversedation.  Rest as above. 56 year old male with unremarkable PMH, presents with mild TBI - nondisplaced R calvarial fracture, trace tSAH/SDH and EDH. S/p mechanical fall.  Cleared by Trauma. Admitted to NCCU.  Neurochecks, stability CTh and CTA - pending.  Maintain normotension.  NPO for now.  pain control, avoid oversedation.  Rest as above.      Pt is critically ill and at high risk of rapid deterioration/death due to abovementioned conditions.   Still requires critical care interventions - ongoing frequent evaluations, interventions and management adjustment by the Attending and ICU team, - and included review of relevant history, clinical examination, review of data and images, discussion of treatment with the multidisciplinary team and any consultants involved in this patient’s care as well as family discussion.

## 2021-12-21 NOTE — CONSULT NOTE ADULT - ASSESSMENT
56 year old male w/ no known PMHX presents to ED s/p 4-5 feet fall off back of truck at work onto head, +LOC, +N/V, -AC/AP. CTH found nondisplaced R calvarial fracture, trace tSAH/SDH and EDH.     Plan:  -D/w Dr. Moura  -Patient cleared by trauma   -Admit to NSICU  -Q1hr neuro checks, Q1hr vitals   -Keppra 500mg BID   -Repeat CTH in 6hours along with CTA/CTV Head & Neck  -Pain control PRN; avoid oversedation  -SBP goal 100-140  -Hydralazine / Labetalol PRN  -Regular diet - Soy restriction (anaphylaxis), no apples/oranges (allergies)  -Zofran PRN  -SCDs b/l for DVT prophylaxis   -Hold all AC/AP this time   -PT/OT  -Neurosurgery following 56 year old male w/ no known PMHX presents to ED s/p 4-5 feet fall off back of truck at work onto head, +LOC, +N/V, -AC/AP. CTH found nondisplaced R calvarial fracture, trace tSAH/SDH and EDH.     Plan:  -D/w Dr. Moura  -Patient cleared by trauma   -Admit to NSICU  -Q1hr neuro checks, Q1hr vitals   -Keppra 500mg BID   -Repeat CTH in 6hours along with CTA/CTV Head & Neck  -Pain control PRN; avoid oversedation  -SBP goal 100-140  -Hydralazine / Labetalol PRN  -Regular diet - Soy restriction (anaphylaxis), no apples/oranges (allergies)  -Zofran PRN  -SCDs b/l for DVT prophylaxis   -Hold all AC/AP this time   -PT/OT  -Neurosurgery following

## 2021-12-21 NOTE — ED PROVIDER NOTE - PHYSICAL EXAMINATION
Constitutional: Awake, alert, in no acute distress  Eyes: PERRL  HENT: scalp tenderness on hematoma on occipital region. No facial tenderness, airway patent  Neck: no cervical spine tenderness, no palpable stepoff, no tracheal deviation  CV: no chest wall tenderness, no crepitus or subcutaneous emphysema.  RRR, no murmur, 2+ distal pulses in all extremities  Pulm: non-labored respirations, CTAB  Abdomen: soft, non-tender, non-distended, no ecchymosis  Back: no spinal tenderness, no palpable stepoff  Extremities: stable pelvis, no extremity tenderness or deformity  Skin: no rash  Neuro: AAOx3, GCS 15, moving all extremities equally, no focal neurologic deficit

## 2021-12-21 NOTE — ED ADULT TRIAGE NOTE - CHIEF COMPLAINT QUOTE
pt a+ox3, BIBA s/p fall from 4-5ft, reports hitting head and +LOC. pt reports intermittent nausea. no obvious deformity noted, MD @ bedside for eval.

## 2021-12-21 NOTE — CONSULT NOTE ADULT - ATTENDING COMMENTS
Neurologically intact.     No obvious distracting injury, GCS 15, doesn't not appear intoxicated/impaired: denies TTP along C spine, denies neck pain with range of motion.     --C spine cleared.   --No indication for further workup per trauma service.   --Rest of care per primary team/NSG.

## 2021-12-21 NOTE — H&P ADULT - HISTORY OF PRESENT ILLNESS
56 year old male w/ no PMHX presents to ED s/p fall off back of truck at work onto head, +LOC, +N/V, -AC/AP. Currently N/V under control, mildly hypertensive in ED. Denies back pain, numbness, tingling, syncope, visual disturbances, otorrhea Complaining of HA, b/l jaw pain with opening mouth. Trauma eval, cleared. Patient has L knee brace on that he wears d/t long drives as . CTH found trace tSAH/SDH and EDH. Neuro intact. Admitted to NSICU.  56 year old male w/ no PMHX presents to ED s/p fall off back of truck at work onto head, +LOC, +N/V, -AC/AP. Currently N/V under control, mildly hypertensive in ED. Denies back pain, numbness, tingling, syncope, visual disturbances, otorrhea. Complaining of HA, b/l jaw pain with opening mouth. CTH found R frontal calvarial fracture, trace tSAH/SDH and EDH. Trauma eval, cleared. Patient has L knee brace on that he wears d/t long drives as . Neuro intact. Admitted to NSICU.

## 2021-12-21 NOTE — ED PROVIDER NOTE - ATTENDING CONTRIBUTION TO CARE
above time spent with patient , ems, neurosurgery  for head injury for tx and final disposition    55 yo male with hx of presents to ED s/p fall. Patient fell from about 5 feet high, Hit head, +LOC. Endorses head pain and bump where he hit head.   pe  heent   +hematoma on occiput no step off neck supple nonttp in midline chest clear abd soft nontender neuro nonfocal  dx head injury

## 2021-12-21 NOTE — ED ADULT NURSE REASSESSMENT NOTE - ED NEURO NIH ASSESS
within defined limits

## 2021-12-21 NOTE — CONSULT NOTE ADULT - SUBJECTIVE AND OBJECTIVE BOX
TRAUMA SERVICE (Acute Care Surgery / [facility] - ) - CONSULT NOTE  --------------------------------------------------------------------------------------------    TRAUMA ACTIVATION LEVEL: Consult    MECHANISM OF INJURY:      [x] Blunt  	[] MVC	[x] Fall (from 3-4ft)  [] Pedestrian Struck	[] Motorcycle accident      [] Penetrating  	[] Gun Shot Wound 		[] Stab Wound    GCS: 	E: 4	V: 5	M: 6      HPI:   Patient is a 56y old  Male who presents s/p mechanical fall from 3-4ft with +HS, +LOC. States he was standing on the lift of his truck, attempting to secure packages when he fell backwards. He hit his head on the concrete ground.    Primary Survey:    A - airway intact  B - bilateral breath sounds and good chest rise  C - initial BP: 107/59 (12-21-21 @ 12:13) , HR: 62 (12-21-21 @ 12:13) , palpable pulses in all extremities  D - GCS 15 on arrival  Exposure obtained      Secondary Survey:  General: NAD  HEENT: Normocephalic, atraumatic, EOMI, no raccoon or ordaz signs  Neck: Soft, midline trachea  Chest: No chest wall tenderness.   Cardiac: S1, S2, RRR  Respiratory: Bilateral breath sounds, clear and equal bilaterally  Abdomen: Soft, non-distended, non-tender, no rebound, no guarding, no masses palpated  Pelvis: Stable, non-tender, no ecchymosis  Ext: palp radial b/l UE, b/l DP palp in Lower Extrem, motor and sensory grossly intact in all 4 extremities  Back: no TTP, no palpable runoff/stepoff/deformity    Patient denies fevers/chills, denies lightheadedness/dizziness, denies SOB/chest pain, denies nausea/vomiting, denies constipation/diarrhea.  ***    ROS: 10-system review is otherwise negative except HPI above.      PAST MEDICAL & SURGICAL HISTORY:  No pertinent past medical history    No significant past surgical history      FAMILY HISTORY:    [] Family history not pertinent as reviewed with the patient and family    SOCIAL HISTORY:  Denies toxic habits x3    ALLERGIES: apple (Eye Irritation; Rash)  No Known Drug Allergies  Orange (Eye Irritation; Rash)  Soy (Anaphylaxis)      HOME MEDICATIONS: none    CURRENT MEDICATIONS  MEDICATIONS (STANDING):   MEDICATIONS (PRN):hydrALAZINE Injectable 10 milliGRAM(s) IV Push every 3 hours PRN SBP > 140  labetalol Injectable 10 milliGRAM(s) IV Push every 2 hours PRN SBP>140    --------------------------------------------------------------------------------------------    Vitals:   T(C): 36.3 (12-21-21 @ 11:24), Max: 36.3 (12-21-21 @ 11:24)  HR: 62 (12-21-21 @ 12:13) (62 - 67)  BP: 107/59 (12-21-21 @ 12:13) (107/59 - 120/77)  RR: 20 (12-21-21 @ 12:13) (20 - 20)  SpO2: 97% (12-21-21 @ 12:13) (97% - 98%)  CAPILLARY BLOOD GLUCOSE      POCT Blood Glucose.: 146 mg/dL (21 Dec 2021 11:23)    CAPILLARY BLOOD GLUCOSE      POCT Blood Glucose.: 146 mg/dL (21 Dec 2021 11:23)      Height (cm): 165.1 (12-21 @ 11:24)  Weight (kg): 81.6 (12-21 @ 11:24)  BMI (kg/m2): 29.9 (12-21 @ 11:24)  BSA (m2): 1.89 (12-21 @ 11:24)    PHYSICAL EXAM:   General: NAD  HEENT: Normocephalic, atraumatic, EOMI, PEERLA.  Neck: Soft, midline trachea.  Chest: No chest wall tenderness.   Cardiac: S1, S2, RRR  Respiratory: Bilateral breath sounds, clear and equal bilaterally  Abdomen: Soft, non-distended, non-tender TTP periumbilically, no rebound, +guarding  Pelvis: Stable, non-tender, no ecchymosis  Ext: palp radial b/l UE, b/l DP palp in Lower Extrem.   Back: no TTP, no palpable runoff/stepoff/deformity  Rectal: No javier blood, JINNY with good tone    --------------------------------------------------------------------------------------------    LABS  CBC (12-21 @ 12:18)                              13.5                           8.29    )----------------(  254        51.6  % Neutrophils, 39.1  % Lymphocytes, ANC: 4.28                                42.1      BMP (12-21 @ 12:18)             137     |  101     |  19.8  		Ca++ --      Ca 8.5<L>             ---------------------------------( 155<H>		Mg --                 3.9     |  23.0    |  1.12  			Ph --        LFTs (12-21 @ 12:18)      TPro 7.0 / Alb 4.1 / TBili 0.3<L> / DBili -- / AST 16 / ALT 16 / AlkPhos 93    Coags (12-21 @ 12:18)  aPTT 26.4<L> / INR 1.05 / PT 12.2      --------------------------------------------------------------------------------------------    IMAGING  < from: CT Head No Cont (12.21.21 @ 11:33) >  IMPRESSION:    1. Trace amounts of acute subarachnoid hemorrhage within the bifrontal   sulci.    2. Trace subdural hemorrhage along the bifrontal convexities and also   along the anterior falx and right anterior tentorial leaflet. No   associated mass effect, shift of the midline structures, or herniation.    3. Small vertex epidural hematoma. Associated curvilinear nondisplaced   right frontal calvarial fracture tracking superiorly into the sagittal   suture. Overlying scalp soft tissue swelling. No shift of the midline   structures or herniation.    4. Chronic left sided maxillary sinusitis. A superimposed lesion inside   of the left maxillary sinus is difficult to exclude given calcified   material.    < end of copied text >      --------------------------------------------------------------------------------------------    
HPI:  56 year old male w/ no known PMHX presents to ED s/p fall 4-5 feet off back of truck at work onto head, +LOC, +N/V, -AC/AP. Currently N/V under control, mildly hypertensive in ED. Denies back pain, numbness, tingling, weakness, visual disturbances, otorrhea. Patient reports fall was strictly mechanical, and denies syncope/pre-syncopal event before fall such as dizziness, palpitations, lightheadedness. Complaining of HA, b/l jaw pain with opening mouth. Trauma eval, cleared. Patient has L knee brace on that he wears d/t long drives as . CTH found trace tSAH/SDH and EDH, nondisplaced R frontal calvarial fx tracking into sagittal suture. Neuro intact. Admitted to NSICU.  (21 Dec 2021 13:54)      PAST MEDICAL & SURGICAL HISTORY:  No pertinent past medical history  No significant past surgical history    FAMILY HISTORY:  No pertinent family history in first degree relatives    SOCIAL HISTORY:  Tobacco Use: Denies  EtOH use: Denies  Substance: Denies   Works as .     Allergies:  apple (Eye Irritation; Rash)  No Known Drug Allergies  Orange (Eye Irritation; Rash)  Soy (Anaphylaxis)    REVIEW OF SYSTEMS  Negative except as noted in HPI    HOME MEDICATIONS:  Home Medications: N/A      MEDICATIONS:  Antibiotics:    Neuro:  levETIRAcetam 500 milliGRAM(s) Oral two times a day  ondansetron Injectable 4 milliGRAM(s) IV Push every 6 hours PRN    Anticoagulation:    OTHER:  hydrALAZINE Injectable 10 milliGRAM(s) IV Push every 3 hours PRN  labetalol Injectable 10 milliGRAM(s) IV Push every 2 hours PRN  senna 2 Tablet(s) Oral at bedtime    IVF:      Vital Signs Last 24 Hrs  T(C): 36.3 (21 Dec 2021 11:24), Max: 36.3 (21 Dec 2021 11:24)  T(F): 97.4 (21 Dec 2021 11:24), Max: 97.4 (21 Dec 2021 11:24)  HR: 74 (21 Dec 2021 14:01) (62 - 74)  BP: 114/62 (21 Dec 2021 14:01) (107/59 - 120/77)  BP(mean): 83 (21 Dec 2021 14:01) (75 - 83)  RR: 22 (21 Dec 2021 14:01) (20 - 22)  SpO2: 97% (21 Dec 2021 14:01) (97% - 98%)      PHYSICAL EXAM:  GENERAL: NAD, well-groomed, calm, pleasant   HEAD: R frontal scalp hematoma, normocephalic  EYES: Conjunctiva and sclera clear b/l  NECK: Supple, no midline tenderness, C collar cleared   SHORTY COMA SCORE: E-4 V-5 M-6 = 15  MENTAL STATUS: AAO x3; Awake; Opens eyes spontaneously; Appropriately conversant in Malay; Following commands   CRANIAL NERVES: Visual fields full to confrontation, PERRL. EOMI without nystagmus. Facial sensation intact V1-3 distribution b/l. Face symmetric w/ normal eye closure and smile, tongue midline. Hearing grossly intact. Speech clear. Head turning and shoulder shrug intact  MOTOR: Strength 5/5 b/l upper and lower extremities  SENSATION: Grossly intact to light touch all extremities  COORDINATION: No upper extremity dysmetria b/l  CHEST/LUNG: Nonlabored breathing, no signs of respiratory distress   HEART: +S1/+S2; Regular rate and rhythm; no murmurs  ABDOMEN: Soft, nontender, nondistended  EXTREMITIES: No calf tenderness or edema b/l  SKIN: Warm, dry    LABS:                        13.5   8.29  )-----------( 254      ( 21 Dec 2021 12:18 )             42.1     12-21    137  |  101  |  19.8  ----------------------------<  155<H>  3.9   |  23.0  |  1.12    Ca    8.5<L>      21 Dec 2021 12:18    TPro  7.0  /  Alb  4.1  /  TBili  0.3<L>  /  DBili  x   /  AST  16  /  ALT  16  /  AlkPhos  93  12-21    PT/INR - ( 21 Dec 2021 12:18 )   PT: 12.2 sec;   INR: 1.05 ratio         PTT - ( 21 Dec 2021 12:18 )  PTT:26.4 sec      CULTURES:      RADIOLOGY & ADDITIONAL STUDIES:  CT Head No Cont (12.21.21 @ 11:33):  IMPRESSION:  1. Trace amounts of acute subarachnoid hemorrhage within the bifrontal   sulci.  2. Trace subdural hemorrhage along the bifrontal convexities and also   along the anterior falx and right anterior tentorial leaflet. No   associated mass effect, shift of the midline structures, or herniation.  3. Small vertex epidural hematoma. Associated curvilinear nondisplaced   right frontal calvarial fracture tracking superiorly into the sagittal   suture. Overlying scalp soft tissue swelling. No shift of the midline   structures or herniation.  4. Chronic left sided maxillary sinusitis. A superimposed lesion inside   of the left maxillary sinus is difficult to exclude given calcified   material.

## 2021-12-22 LAB
A1C WITH ESTIMATED AVERAGE GLUCOSE RESULT: 6.4 % — HIGH (ref 4–5.6)
ANION GAP SERPL CALC-SCNC: 14 MMOL/L — SIGNIFICANT CHANGE UP (ref 5–17)
BUN SERPL-MCNC: 13.3 MG/DL — SIGNIFICANT CHANGE UP (ref 8–20)
CALCIUM SERPL-MCNC: 8.9 MG/DL — SIGNIFICANT CHANGE UP (ref 8.6–10.2)
CHLORIDE SERPL-SCNC: 103 MMOL/L — SIGNIFICANT CHANGE UP (ref 98–107)
CHOLEST SERPL-MCNC: 244 MG/DL — HIGH
CO2 SERPL-SCNC: 23 MMOL/L — SIGNIFICANT CHANGE UP (ref 22–29)
CREAT SERPL-MCNC: 1.06 MG/DL — SIGNIFICANT CHANGE UP (ref 0.5–1.3)
ESTIMATED AVERAGE GLUCOSE: 137 MG/DL — HIGH (ref 68–114)
GLUCOSE SERPL-MCNC: 115 MG/DL — HIGH (ref 70–99)
HCT VFR BLD CALC: 39.1 % — SIGNIFICANT CHANGE UP (ref 39–50)
HCV AB S/CO SERPL IA: 0.08 S/CO — SIGNIFICANT CHANGE UP (ref 0–0.99)
HCV AB SERPL-IMP: SIGNIFICANT CHANGE UP
HDLC SERPL-MCNC: 58 MG/DL — SIGNIFICANT CHANGE UP
HGB BLD-MCNC: 13 G/DL — SIGNIFICANT CHANGE UP (ref 13–17)
LIPID PNL WITH DIRECT LDL SERPL: 158 MG/DL — HIGH
MAGNESIUM SERPL-MCNC: 2.3 MG/DL — SIGNIFICANT CHANGE UP (ref 1.6–2.6)
MCHC RBC-ENTMCNC: 27.9 PG — SIGNIFICANT CHANGE UP (ref 27–34)
MCHC RBC-ENTMCNC: 33.2 GM/DL — SIGNIFICANT CHANGE UP (ref 32–36)
MCV RBC AUTO: 83.9 FL — SIGNIFICANT CHANGE UP (ref 80–100)
NON HDL CHOLESTEROL: 186 MG/DL — HIGH
PHOSPHATE SERPL-MCNC: 4.1 MG/DL — SIGNIFICANT CHANGE UP (ref 2.4–4.7)
PLATELET # BLD AUTO: 271 K/UL — SIGNIFICANT CHANGE UP (ref 150–400)
POTASSIUM SERPL-MCNC: 3.8 MMOL/L — SIGNIFICANT CHANGE UP (ref 3.5–5.3)
POTASSIUM SERPL-SCNC: 3.8 MMOL/L — SIGNIFICANT CHANGE UP (ref 3.5–5.3)
RBC # BLD: 4.66 M/UL — SIGNIFICANT CHANGE UP (ref 4.2–5.8)
RBC # FLD: 14.6 % — HIGH (ref 10.3–14.5)
SODIUM SERPL-SCNC: 140 MMOL/L — SIGNIFICANT CHANGE UP (ref 135–145)
TRIGL SERPL-MCNC: 142 MG/DL — SIGNIFICANT CHANGE UP
TSH SERPL-MCNC: 1.25 UIU/ML — SIGNIFICANT CHANGE UP (ref 0.27–4.2)
WBC # BLD: 15.39 K/UL — HIGH (ref 3.8–10.5)
WBC # FLD AUTO: 15.39 K/UL — HIGH (ref 3.8–10.5)

## 2021-12-22 PROCEDURE — 70450 CT HEAD/BRAIN W/O DYE: CPT | Mod: 26

## 2021-12-22 PROCEDURE — 99233 SBSQ HOSP IP/OBS HIGH 50: CPT

## 2021-12-22 PROCEDURE — 99232 SBSQ HOSP IP/OBS MODERATE 35: CPT

## 2021-12-22 RX ORDER — SODIUM CHLORIDE 9 MG/ML
500 INJECTION INTRAMUSCULAR; INTRAVENOUS; SUBCUTANEOUS ONCE
Refills: 0 | Status: COMPLETED | OUTPATIENT
Start: 2021-12-22 | End: 2021-12-22

## 2021-12-22 RX ADMIN — OXYCODONE HYDROCHLORIDE 10 MILLIGRAM(S): 5 TABLET ORAL at 00:04

## 2021-12-22 RX ADMIN — LEVETIRACETAM 500 MILLIGRAM(S): 250 TABLET, FILM COATED ORAL at 05:34

## 2021-12-22 RX ADMIN — OXYCODONE HYDROCHLORIDE 10 MILLIGRAM(S): 5 TABLET ORAL at 01:04

## 2021-12-22 RX ADMIN — LEVETIRACETAM 500 MILLIGRAM(S): 250 TABLET, FILM COATED ORAL at 17:31

## 2021-12-22 RX ADMIN — SENNA PLUS 2 TABLET(S): 8.6 TABLET ORAL at 00:05

## 2021-12-22 RX ADMIN — OXYCODONE HYDROCHLORIDE 10 MILLIGRAM(S): 5 TABLET ORAL at 21:39

## 2021-12-22 RX ADMIN — OXYCODONE HYDROCHLORIDE 10 MILLIGRAM(S): 5 TABLET ORAL at 09:46

## 2021-12-22 RX ADMIN — SODIUM CHLORIDE 500 MILLILITER(S): 9 INJECTION INTRAMUSCULAR; INTRAVENOUS; SUBCUTANEOUS at 04:20

## 2021-12-22 RX ADMIN — SENNA PLUS 2 TABLET(S): 8.6 TABLET ORAL at 22:51

## 2021-12-22 RX ADMIN — SODIUM CHLORIDE 500 MILLILITER(S): 9 INJECTION INTRAMUSCULAR; INTRAVENOUS; SUBCUTANEOUS at 08:06

## 2021-12-22 RX ADMIN — OXYCODONE HYDROCHLORIDE 10 MILLIGRAM(S): 5 TABLET ORAL at 22:30

## 2021-12-22 NOTE — PATIENT PROFILE ADULT - FALL HARM RISK - HARM RISK INTERVENTIONS

## 2021-12-22 NOTE — PATIENT PROFILE ADULT - FUNCTIONAL ASSESSMENT - BASIC MOBILITY 5.
Addended by: MIKHAIL VEGA on: 12/1/2017 12:04 PM     Modules accepted: Orders    
4 = No assist / stand by assistance

## 2021-12-22 NOTE — PROGRESS NOTE ADULT - SUBJECTIVE AND OBJECTIVE BOX
Chief complaint:   Patient is a 56y old  Male who presents with a chief complaint of tSDH/SAH/EDH (21 Dec 2021 14:11)    HPI:  56 year old male w/ no PMHX presents to ED s/p fall off back of truck at work onto head, +LOC, +N/V, -AC/AP. Currently N/V under control, mildly hypertensive in ED. Denies back pain, numbness, tingling, syncope, visual disturbances, otorrhea. Complaining of HA, b/l jaw pain with opening mouth. CTH found R frontal calvarial fracture, trace tSAH/SDH and EDH. Trauma eval, cleared. Patient has L knee brace on that he wears d/t long drives as . Neuro intact. Admitted to NSICU.  (21 Dec 2021 13:54)        24hr EVENTS:      ROS: [ ]  Unable to assess due to mental status   All other systems negative    -----------------------------------------------------------------------------------------------------------------------------------------------------------------------------------  ICU Vital Signs Last 24 Hrs  T(C): 36.7 (22 Dec 2021 04:00), Max: 36.7 (22 Dec 2021 04:00)  T(F): 98 (22 Dec 2021 04:00), Max: 98 (22 Dec 2021 04:00)  HR: 79 (22 Dec 2021 07:45) (62 - 96)  BP: 121/75 (22 Dec 2021 07:45) (95/53 - 121/75)  BP(mean): 92 (22 Dec 2021 07:45) (69 - 92)  ABP: --  ABP(mean): --  RR: 18 (22 Dec 2021 07:00) (15 - 26)  SpO2: 95% (22 Dec 2021 07:00) (94% - 98%)      I&O's Summary    21 Dec 2021 07:01  -  22 Dec 2021 07:00  --------------------------------------------------------  IN: 650 mL / OUT: 1350 mL / NET: -700 mL        MEDICATIONS  (STANDING):  levETIRAcetam 500 milliGRAM(s) Oral two times a day  senna 2 Tablet(s) Oral at bedtime      RESPIRATORY:        IMAGING:   Recent imaging studies were reviewed.    LAB RESULTS:                          13.0   15.39 )-----------( 271      ( 22 Dec 2021 04:19 )             39.1       PT/INR - ( 21 Dec 2021 12:18 )   PT: 12.2 sec;   INR: 1.05 ratio         PTT - ( 21 Dec 2021 12:18 )  PTT:26.4 sec    12-22    140  |  103  |  13.3  ----------------------------<  115<H>  3.8   |  23.0  |  1.06    Ca    8.9      22 Dec 2021 04:19  Phos  4.1     12-22  Mg     2.3     12-22    TPro  7.0  /  Alb  4.1  /  TBili  0.3<L>  /  DBili  x   /  AST  16  /  ALT  16  /  AlkPhos  93  12-21      -----------------------------------------------------------------------------------------------------------------------------------------------------------------------------------    PHYSICAL EXAM:  General: Calm  HEENT: MMM  Neuro:  -Mental status- No acute distress  -CN- PERRL 3mm, EOMI, tongue midline, face symmetric    CV: RRR  Pulm: clear to auscultation  Abd: Soft, nontender, nondistended  Ext: no noted edema in lower ext  Skin: warm, dry

## 2021-12-22 NOTE — CHART NOTE - NSCHARTNOTEFT_GEN_A_CORE
HPI:   56 year old male w/ no PMHX presents to ED s/p fall off back of truck at work onto head, +LOC, +N/V, -AC/AP. Currently N/V under control, mildly hypertensive in ED. Denies back pain, numbness, tingling, syncope, visual disturbances, otorrhea. Complaining of HA, b/l jaw pain with opening mouth. CTH found R frontal calvarial fracture, trace tSAH/SDH and EDH. Trauma eval, cleared. Patient has L knee brace on that he wears d/t long drives as . Neuro intact. Admitted to NSICU.  (21 Dec 2021 13:54)    Hospital Course:  12/21: Admitted to NSICU. C spine cleared. CTH shows  R frontal calvarial fracture, trace tSAH/SDH and EDH. Cleared by trauma. Repeat CTH stable. CTV shows no obvious superior sagittal sinus venous thrombosis. CTA unremarkable.  12/22: Patient remains neurologically intact. No acute overnight events reported.    Vital Signs Last 24 Hrs  T(C): 36.7 (22 Dec 2021 08:00), Max: 36.7 (22 Dec 2021 04:00)  T(F): 98 (22 Dec 2021 08:00), Max: 98 (22 Dec 2021 04:00)  HR: 71 (22 Dec 2021 10:00) (66 - 96)  BP: 101/63 (22 Dec 2021 10:00) (95/53 - 121/75)  BP(mean): 77 (22 Dec 2021 10:00) (69 - 92)  RR: 18 (22 Dec 2021 10:00) (15 - 26)  SpO2: 96% (22 Dec 2021 10:00) (94% - 98%)    PHYSICAL EXAM:  GENERAL: NAD, well-groomed, calm, pleasant   HEAD: R frontal scalp hematoma, normocephalic  EYES: Conjunctiva and sclera clear b/l  NECK: Supple, no midline tenderness, C collar cleared   SHORTY COMA SCORE: E-4 V-5 M-6 = 15  MENTAL STATUS: AAO x3; Awake; Opens eyes spontaneously; Appropriately conversant in Hungarian; Following commands   CRANIAL NERVES: Visual fields full to confrontation, PERRL. EOMI without nystagmus. Facial sensation intact V1-3 distribution b/l. Face symmetric w/ normal eye closure and smile, tongue midline. Hearing grossly intact. Speech clear. Head turning and shoulder shrug intact  MOTOR: Strength 5/5 b/l upper and lower extremities  SENSATION: Grossly intact to light touch all extremities  COORDINATION: No upper extremity dysmetria b/l  CHEST/LUNG: Nonlabored breathing, no signs of respiratory distress   HEART: +S1/+S2; Regular rate and rhythm; no murmurs  ABDOMEN: Soft, nontender, nondistended  EXTREMITIES: No calf tenderness or edema b/l  SKIN: Warm, dry    LABS:                        13.0   15.39 )-----------( 271      ( 22 Dec 2021 04:19 )             39.1     12-22    140  |  103  |  13.3  ----------------------------<  115<H>  3.8   |  23.0  |  1.06    Ca    8.9      22 Dec 2021 04:19  Phos  4.1     12-22  Mg     2.3     12-22    TPro  7.0  /  Alb  4.1  /  TBili  0.3<L>  /  DBili  x   /  AST  16  /  ALT  16  /  AlkPhos  93  12-21    PT/INR - ( 21 Dec 2021 12:18 )   PT: 12.2 sec;   INR: 1.05 ratio         PTT - ( 21 Dec 2021 12:18 )  PTT:26.4 sec      12-21 @ 07:01  -  12-22 @ 07:00  --------------------------------------------------------  IN: 650 mL / OUT: 1350 mL / NET: -700 mL        RADIOLOGY & ADDITIONAL TESTS:  CT Head/ CT Venogram & CT Angio Head & Neck w/ IV Cont (12.21.21 @ 18:28):  IMPRESSION:  CT brain:  There is essentially stable trace subarachnoid and trace bifrontal and   falcine subdural hematomas as described without midline shift or mass   effect.  There is a stable in thickness venous epidural hematoma at the skull   vertex again without mass effect as described.  Acute calvarial fracture tracking along the sagittal suture. The superior   sagittal sinus appears thin and inferiorly displaced at the skull vertex   by the venous epidural hematoma. No obvious superior sagittal sinus   venous thrombosis is seen.  CTA brain: There is no large vessel occlusion or aneurysm involving major   proximal intracranial arteries. There is no dominant arteriovenous   formation.  CTA NECK: There is no stenosis involving major neck arteries.    Assessment: 56 year old male w/ no known PMHX presents to ED s/p 4-5 feet fall off back of truck at work onto head, +LOC, +N/V, -AC/AP. CTH found nondisplaced R calvarial fracture, trace tSAH/SDH and EDH.     Plan:  -D/w Dr. Hogan   -Q2hr neuro checks   -Repeat CTH 5pm today 12/22  -Repeat CTH/CT Venogram on 12/24  -STAT CTH for any change in mental status   -Keppra 500mg BID   -Pain control PRN; avoid oversedation  -SBP goal 100-140  -Hydralazine / Labetalol PRN  -Regular diet - Soy restriction (anaphylaxis), no apples/oranges (allergies)  -Zofran PRN  -SCDs b/l for DVT prophylaxis   -Hold all AC/AP this time   -PT/OT  -Patient clinically and radiographically stable. Patient appropriate for downgrade to stepdown unit under neurosurgery. Patient signed out to and accepted by neurosurgery team. Discussed at length, all questions answered.

## 2021-12-22 NOTE — PATIENT PROFILE ADULT - FUNCTIONAL ASSESSMENT - DAILY ACTIVITY SCORE.
Patient:   PHILOMENA MICHELLE            MRN: [id number]            FIN: [id number]              Age:   33 years     Sex:  FEMALE     :  87   Associated Diagnoses:   None   Author:   LUISA ROSS     Late Entry due to patient care  R1 Labor Note  Pt received epidural. Resting comfortably, no concerns at this time.    Vitals between:   2020 06:18:23   TO   10-JUL-2020 06:18:23                   LAST RESULT MINIMUM MAXIMUM  Temperature 36.5 36.5 37.2  Heart Rate 47 47 85  Respiratory Rate 18 18 18  NISBP           128 121 144  NIDBP           71 67 99  NIMBP           90 88 114  SpO2                    99 99 100   SVE: 60/-3 @0543  EFM: 150 bpm, + moderate variability, + accels, recurrent variable and late decels  TOCO: q 3-5 mins  A/P:  32 yo  @ 40+2 wga d/b L=6wk US a/f eIOL, with no complaints at this time.  -Labor: miso x2 @0345  -MWB: TONIO infusing  -FWB: Cat 2 tracing  Dr. Rivero made aware. Dr. Ulloa is in the room  Luisa Kwong DO, MPH PGY-1  Obstetrics & Gynecology    patient with 4 late decels and large variable to 80, minimal variability. terb give 0841  SVE 3/7/0/-3   Dr Rivero aware and on her way   Counseled patient on recommendation for CS, r/b/a verbally discussed  Savannah Mcmanus MD PGY3  [facility] Obstetrics & Gynecology Resident     24

## 2021-12-22 NOTE — PROGRESS NOTE ADULT - SUBJECTIVE AND OBJECTIVE BOX
NCCU Transfer Note:      HPI:  56 year old male w/ no PMHX presents to ED s/p fall off back of truck at work onto head, +LOC, +N/V, -AC/AP. Currently N/V under control, mildly hypertensive in ED. Denies back pain, numbness, tingling, syncope, visual disturbances, otorrhea. Complaining of HA, b/l jaw pain with opening mouth. CTH found R frontal calvarial fracture, trace tSAH/SDH and EDH. Trauma eval, cleared. Patient has L knee brace on that he wears d/t long drives as . Neuro intact. Admitted to NSICU.  (21 Dec 2021 13:54)    Interval History:  24 Hour Events: No acute events.    1. Patient's Neurologic Exam unchanged.    2. Patient's Hemodynamic's maintained without drips.    3. Patient's Respiratory status is: adequate    4. Patient is pending: Patient is pending further neurologic recovery, further neurologic, respiratory, and hemodynamic monitoring & management in the ICU.     5. Dispo: Downgrade to step down         Physical Exam:  Constitutional: NAD    Neuro  * Mental Status:  GCS 15:  E(4), V(5), M(6).  Awake, alert, oriented to conversation.  * Cranial Nerves: Cnii-Cnxii grossly intact. PERRL, EOMI, tongue midline, no gaze deviation  * Motor: RUE 5/5, LUE 5/5, RLE 5/5, LLE 5/5  * Sensory: Sensation intact to light touch  * Reflexes: Not assessed  * Gait: Not assessed    Vitals:  Vital Signs Last 24 Hrs  T(C): 36.7 (22 Dec 2021 08:00), Max: 36.7 (22 Dec 2021 04:00)  T(F): 98 (22 Dec 2021 08:00), Max: 98 (22 Dec 2021 04:00)  HR: 71 (22 Dec 2021 10:00) (62 - 96)  BP: 101/63 (22 Dec 2021 10:00) (95/53 - 121/75)  BP(mean): 77 (22 Dec 2021 10:00) (69 - 92)  RR: 18 (22 Dec 2021 10:00) (15 - 26)  SpO2: 96% (22 Dec 2021 10:00) (94% - 98%)    I&O:  I&O's Summary    21 Dec 2021 07:01  -  22 Dec 2021 07:00  --------------------------------------------------------  IN: 650 mL / OUT: 1350 mL / NET: -700 mL        Labs & Radiology:                        13.0   15.39 )-----------( 271      ( 22 Dec 2021 04:19 )             39.1       12-22    140  |  103  |  13.3  ----------------------------<  115<H>  3.8   |  23.0  |  1.06    Ca    8.9      22 Dec 2021 04:19  Phos  4.1     12-22  Mg     2.3     12-22    TPro  7.0  /  Alb  4.1  /  TBili  0.3<L>  /  DBili  x   /  AST  16  /  ALT  16  /  AlkPhos  93  12-21      LIVER FUNCTIONS - ( 21 Dec 2021 12:18 )  Alb: 4.1 g/dL / Pro: 7.0 g/dL / ALK PHOS: 93 U/L / ALT: 16 U/L / AST: 16 U/L / GGT: x                   PT/INR - ( 21 Dec 2021 12:18 )   PT: 12.2 sec;   INR: 1.05 ratio         PTT - ( 21 Dec 2021 12:18 )  PTT:26.4 sec            CAPILLARY BLOOD GLUCOSE      POCT Blood Glucose.: 164 mg/dL (21 Dec 2021 14:39)  POCT Blood Glucose.: 146 mg/dL (21 Dec 2021 11:23)      Neurosurgery Imaging:      Medications:  MEDICATIONS  (STANDING):  levETIRAcetam 500 milliGRAM(s) Oral two times a day  senna 2 Tablet(s) Oral at bedtime    MEDICATIONS  (PRN):  acetaminophen     Tablet .. 650 milliGRAM(s) Oral every 6 hours PRN Temp greater or equal to 38C (100.4F), Mild Pain (1 - 3)  hydrALAZINE Injectable 10 milliGRAM(s) IV Push every 3 hours PRN SBP > 140  labetalol Injectable 10 milliGRAM(s) IV Push every 2 hours PRN SBP>140  ondansetron Injectable 4 milliGRAM(s) IV Push every 6 hours PRN Nausea and/or Vomiting  oxyCODONE    IR 5 milliGRAM(s) Oral every 4 hours PRN Moderate Pain (4 - 6)  oxyCODONE    IR 10 milliGRAM(s) Oral every 4 hours PRN Severe Pain (7 - 10)      Assessment:  56 year old male w/ no PMHX presents to ED s/p fall off back of truck at work onto head, +LOC, +N/V, -AC/AP. Currently N/V under control, mildly hypertensive in ED. Denies back pain, numbness, tingling, syncope, visual disturbances, otorrhea. Complaining of HA, b/l jaw pain with opening mouth. CTH found R frontal calvarial fracture, trace tSAH/SDH and EDH. Trauma eval, cleared. Patient has L knee brace on that he wears d/t long drives as . Neuro intact. Admitted to NSICU.     Q2 neuro checks  Keppra 500 BID  SBP Goal 100-140 Labetalol and hydralazine PRN  Room Air  TOlerating Diet.  Senna and zofran PRN  SCD for dvt prophylaxis.  Chemical dvt prophylaxis contraindicated per neurosurgery  CTH 5pm - ordered  Repeat vascular imaging per neurosurgery is not yet ordered.     NCCU Transfer Note:      HPI:  56 year old male w/ no PMHX presents to ED s/p fall off back of truck at work onto head, +LOC, +N/V, -AC/AP. Currently N/V under control, mildly hypertensive in ED. Denies back pain, numbness, tingling, syncope, visual disturbances, otorrhea. Complaining of HA, b/l jaw pain with opening mouth. CTH found R frontal calvarial fracture, trace tSAH/SDH and EDH. Trauma eval, cleared. Patient has L knee brace on that he wears d/t long drives as . Neuro intact. Admitted to NSICU.  (21 Dec 2021 13:54)    Interval History:  24 Hour Events: No acute events.    1. Patient's Neurologic Exam unchanged.    2. Patient's Hemodynamic's maintained without drips.    3. Patient's Respiratory status is: adequate    4. Patient is pending: Patient is pending further neurologic recovery, further neurologic, respiratory, and hemodynamic monitoring & management in the ICU.     5. Dispo: Downgrade to step down         Physical Exam:  Constitutional: NAD    Neuro  * Mental Status:  GCS 15:  E(4), V(5), M(6).  Awake, alert, oriented to conversation.  * Cranial Nerves: Cnii-Cnxii grossly intact. PERRL, EOMI, tongue midline, no gaze deviation  * Motor: RUE 5/5, LUE 5/5, RLE 5/5, LLE 5/5  * Sensory: Sensation intact to light touch  * Reflexes: Not assessed  * Gait: Not assessed    Vitals:  Vital Signs Last 24 Hrs  T(C): 36.7 (22 Dec 2021 08:00), Max: 36.7 (22 Dec 2021 04:00)  T(F): 98 (22 Dec 2021 08:00), Max: 98 (22 Dec 2021 04:00)  HR: 71 (22 Dec 2021 10:00) (62 - 96)  BP: 101/63 (22 Dec 2021 10:00) (95/53 - 121/75)  BP(mean): 77 (22 Dec 2021 10:00) (69 - 92)  RR: 18 (22 Dec 2021 10:00) (15 - 26)  SpO2: 96% (22 Dec 2021 10:00) (94% - 98%)    I&O:  I&O's Summary    21 Dec 2021 07:01  -  22 Dec 2021 07:00  --------------------------------------------------------  IN: 650 mL / OUT: 1350 mL / NET: -700 mL        Labs & Radiology:                        13.0   15.39 )-----------( 271      ( 22 Dec 2021 04:19 )             39.1       12-22    140  |  103  |  13.3  ----------------------------<  115<H>  3.8   |  23.0  |  1.06    Ca    8.9      22 Dec 2021 04:19  Phos  4.1     12-22  Mg     2.3     12-22    TPro  7.0  /  Alb  4.1  /  TBili  0.3<L>  /  DBili  x   /  AST  16  /  ALT  16  /  AlkPhos  93  12-21      LIVER FUNCTIONS - ( 21 Dec 2021 12:18 )  Alb: 4.1 g/dL / Pro: 7.0 g/dL / ALK PHOS: 93 U/L / ALT: 16 U/L / AST: 16 U/L / GGT: x                   PT/INR - ( 21 Dec 2021 12:18 )   PT: 12.2 sec;   INR: 1.05 ratio         PTT - ( 21 Dec 2021 12:18 )  PTT:26.4 sec            CAPILLARY BLOOD GLUCOSE      POCT Blood Glucose.: 164 mg/dL (21 Dec 2021 14:39)  POCT Blood Glucose.: 146 mg/dL (21 Dec 2021 11:23)      Neurosurgery Imaging:      Medications:  MEDICATIONS  (STANDING):  levETIRAcetam 500 milliGRAM(s) Oral two times a day  senna 2 Tablet(s) Oral at bedtime    MEDICATIONS  (PRN):  acetaminophen     Tablet .. 650 milliGRAM(s) Oral every 6 hours PRN Temp greater or equal to 38C (100.4F), Mild Pain (1 - 3)  hydrALAZINE Injectable 10 milliGRAM(s) IV Push every 3 hours PRN SBP > 140  labetalol Injectable 10 milliGRAM(s) IV Push every 2 hours PRN SBP>140  ondansetron Injectable 4 milliGRAM(s) IV Push every 6 hours PRN Nausea and/or Vomiting  oxyCODONE    IR 5 milliGRAM(s) Oral every 4 hours PRN Moderate Pain (4 - 6)  oxyCODONE    IR 10 milliGRAM(s) Oral every 4 hours PRN Severe Pain (7 - 10)      Assessment:  56 year old male w/ no PMHX presents to ED s/p fall off back of truck at work onto head, +LOC, +N/V, -AC/AP. Currently N/V under control, mildly hypertensive in ED. Denies back pain, numbness, tingling, syncope, visual disturbances, otorrhea. Complaining of HA, b/l jaw pain with opening mouth. CTH found R frontal calvarial fracture, trace tSAH/SDH and EDH. Trauma eval, cleared. Patient has L knee brace on that he wears d/t long drives as . Neuro intact. Admitted to NSICU.     Q2 neuro checks  Keppra 500 BID  SBP Goal 100-140 Labetalol and hydralazine PRN  Room Air  TOlerating Diet.  Senna and zofran PRN  SCD for dvt prophylaxis.  Chemical dvt prophylaxis contraindicated per neurosurgery  CTH 5pm - ordered  Repeat vascular imaging per neurosurgery is not yet ordered.    I attest that this patient was signed out to the accepting service [neurosurgery] on 12/2/21at 10:36.  Patient's relevant past medical history, HPI, hospital course, and post operative course discussed in detail with accepting provider.  An organ system based sign out [as detailed above] was given to accepting provider and all questions answered.      Please see the accepting service's acceptance note regarding their specific plan of care.  Patient's transferred from  the neurocritical care unit potentially have multiple ongoing medical issues and it is recommended that the accepting team conducts their own physical examination of the patient and review of the medical chart.    Please recall neuro ICU at any time [phone] with any questions regarding this patient's care or to clarify any clinical issues.

## 2021-12-23 LAB
ANION GAP SERPL CALC-SCNC: 13 MMOL/L — SIGNIFICANT CHANGE UP (ref 5–17)
BUN SERPL-MCNC: 11.4 MG/DL — SIGNIFICANT CHANGE UP (ref 8–20)
CALCIUM SERPL-MCNC: 9.2 MG/DL — SIGNIFICANT CHANGE UP (ref 8.6–10.2)
CHLORIDE SERPL-SCNC: 101 MMOL/L — SIGNIFICANT CHANGE UP (ref 98–107)
CO2 SERPL-SCNC: 24 MMOL/L — SIGNIFICANT CHANGE UP (ref 22–29)
CREAT SERPL-MCNC: 0.87 MG/DL — SIGNIFICANT CHANGE UP (ref 0.5–1.3)
GLUCOSE SERPL-MCNC: 115 MG/DL — HIGH (ref 70–99)
HCT VFR BLD CALC: 38.8 % — LOW (ref 39–50)
HGB BLD-MCNC: 12.6 G/DL — LOW (ref 13–17)
MAGNESIUM SERPL-MCNC: 2.2 MG/DL — SIGNIFICANT CHANGE UP (ref 1.8–2.6)
MCHC RBC-ENTMCNC: 27.3 PG — SIGNIFICANT CHANGE UP (ref 27–34)
MCHC RBC-ENTMCNC: 32.5 GM/DL — SIGNIFICANT CHANGE UP (ref 32–36)
MCV RBC AUTO: 84 FL — SIGNIFICANT CHANGE UP (ref 80–100)
PHOSPHATE SERPL-MCNC: 3.1 MG/DL — SIGNIFICANT CHANGE UP (ref 2.4–4.7)
PLATELET # BLD AUTO: 252 K/UL — SIGNIFICANT CHANGE UP (ref 150–400)
POTASSIUM SERPL-MCNC: 3.9 MMOL/L — SIGNIFICANT CHANGE UP (ref 3.5–5.3)
POTASSIUM SERPL-SCNC: 3.9 MMOL/L — SIGNIFICANT CHANGE UP (ref 3.5–5.3)
RBC # BLD: 4.62 M/UL — SIGNIFICANT CHANGE UP (ref 4.2–5.8)
RBC # FLD: 14.6 % — HIGH (ref 10.3–14.5)
SODIUM SERPL-SCNC: 138 MMOL/L — SIGNIFICANT CHANGE UP (ref 135–145)
WBC # BLD: 11.9 K/UL — HIGH (ref 3.8–10.5)
WBC # FLD AUTO: 11.9 K/UL — HIGH (ref 3.8–10.5)

## 2021-12-23 RX ADMIN — OXYCODONE HYDROCHLORIDE 10 MILLIGRAM(S): 5 TABLET ORAL at 21:37

## 2021-12-23 RX ADMIN — OXYCODONE HYDROCHLORIDE 5 MILLIGRAM(S): 5 TABLET ORAL at 07:51

## 2021-12-23 RX ADMIN — OXYCODONE HYDROCHLORIDE 5 MILLIGRAM(S): 5 TABLET ORAL at 07:54

## 2021-12-23 RX ADMIN — LEVETIRACETAM 500 MILLIGRAM(S): 250 TABLET, FILM COATED ORAL at 18:56

## 2021-12-23 RX ADMIN — LEVETIRACETAM 500 MILLIGRAM(S): 250 TABLET, FILM COATED ORAL at 05:15

## 2021-12-23 RX ADMIN — OXYCODONE HYDROCHLORIDE 10 MILLIGRAM(S): 5 TABLET ORAL at 22:03

## 2021-12-23 RX ADMIN — OXYCODONE HYDROCHLORIDE 10 MILLIGRAM(S): 5 TABLET ORAL at 16:32

## 2021-12-24 ENCOUNTER — TRANSCRIPTION ENCOUNTER (OUTPATIENT)
Age: 56
End: 2021-12-24

## 2021-12-24 VITALS
TEMPERATURE: 99 F | OXYGEN SATURATION: 94 % | HEART RATE: 61 BPM | RESPIRATION RATE: 19 BRPM | SYSTOLIC BLOOD PRESSURE: 123 MMHG | DIASTOLIC BLOOD PRESSURE: 70 MMHG

## 2021-12-24 LAB
GLUCOSE BLDC GLUCOMTR-MCNC: 105 MG/DL — HIGH (ref 70–99)
GLUCOSE BLDC GLUCOMTR-MCNC: 131 MG/DL — HIGH (ref 70–99)

## 2021-12-24 PROCEDURE — 36415 COLL VENOUS BLD VENIPUNCTURE: CPT

## 2021-12-24 PROCEDURE — C8929: CPT

## 2021-12-24 PROCEDURE — 86901 BLOOD TYPING SEROLOGIC RH(D): CPT

## 2021-12-24 PROCEDURE — 85025 COMPLETE CBC W/AUTO DIFF WBC: CPT

## 2021-12-24 PROCEDURE — 70450 CT HEAD/BRAIN W/O DYE: CPT

## 2021-12-24 PROCEDURE — 86900 BLOOD TYPING SEROLOGIC ABO: CPT

## 2021-12-24 PROCEDURE — 86803 HEPATITIS C AB TEST: CPT

## 2021-12-24 PROCEDURE — 99238 HOSP IP/OBS DSCHRG MGMT 30/<: CPT

## 2021-12-24 PROCEDURE — 84443 ASSAY THYROID STIM HORMONE: CPT

## 2021-12-24 PROCEDURE — 70498 CT ANGIOGRAPHY NECK: CPT | Mod: MA

## 2021-12-24 PROCEDURE — G1004: CPT

## 2021-12-24 PROCEDURE — 80061 LIPID PANEL: CPT

## 2021-12-24 PROCEDURE — 85730 THROMBOPLASTIN TIME PARTIAL: CPT

## 2021-12-24 PROCEDURE — U0005: CPT

## 2021-12-24 PROCEDURE — 70496 CT ANGIOGRAPHY HEAD: CPT | Mod: MA

## 2021-12-24 PROCEDURE — 80048 BASIC METABOLIC PNL TOTAL CA: CPT

## 2021-12-24 PROCEDURE — 83735 ASSAY OF MAGNESIUM: CPT

## 2021-12-24 PROCEDURE — 83036 HEMOGLOBIN GLYCOSYLATED A1C: CPT

## 2021-12-24 PROCEDURE — 85027 COMPLETE CBC AUTOMATED: CPT

## 2021-12-24 PROCEDURE — 70496 CT ANGIOGRAPHY HEAD: CPT | Mod: 26

## 2021-12-24 PROCEDURE — 86850 RBC ANTIBODY SCREEN: CPT

## 2021-12-24 PROCEDURE — 99285 EMERGENCY DEPT VISIT HI MDM: CPT

## 2021-12-24 PROCEDURE — 96374 THER/PROPH/DIAG INJ IV PUSH: CPT

## 2021-12-24 PROCEDURE — 80053 COMPREHEN METABOLIC PANEL: CPT

## 2021-12-24 PROCEDURE — 82962 GLUCOSE BLOOD TEST: CPT

## 2021-12-24 PROCEDURE — 84100 ASSAY OF PHOSPHORUS: CPT

## 2021-12-24 PROCEDURE — U0003: CPT

## 2021-12-24 PROCEDURE — 93005 ELECTROCARDIOGRAM TRACING: CPT

## 2021-12-24 PROCEDURE — 85610 PROTHROMBIN TIME: CPT

## 2021-12-24 RX ORDER — OXYCODONE HYDROCHLORIDE 5 MG/1
1 TABLET ORAL
Qty: 16 | Refills: 0
Start: 2021-12-24 | End: 2021-12-27

## 2021-12-24 RX ORDER — OXYCODONE HYDROCHLORIDE 5 MG/1
1 TABLET ORAL
Qty: 0 | Refills: 0 | DISCHARGE
Start: 2021-12-24

## 2021-12-24 RX ORDER — LEVETIRACETAM 250 MG/1
1 TABLET, FILM COATED ORAL
Qty: 14 | Refills: 0
Start: 2021-12-24 | End: 2021-12-30

## 2021-12-24 RX ADMIN — OXYCODONE HYDROCHLORIDE 10 MILLIGRAM(S): 5 TABLET ORAL at 07:43

## 2021-12-24 RX ADMIN — OXYCODONE HYDROCHLORIDE 10 MILLIGRAM(S): 5 TABLET ORAL at 05:21

## 2021-12-24 RX ADMIN — LEVETIRACETAM 500 MILLIGRAM(S): 250 TABLET, FILM COATED ORAL at 05:14

## 2021-12-24 NOTE — DISCHARGE NOTE PROVIDER - NSDCCPCAREPLAN_GEN_ALL_CORE_FT
PRINCIPAL DISCHARGE DIAGNOSIS  Diagnosis: Subarachnoid hemorrhage  Assessment and Plan of Treatment:       SECONDARY DISCHARGE DIAGNOSES  Diagnosis: Subdural hematoma  Assessment and Plan of Treatment:     Diagnosis: Epidural hematoma  Assessment and Plan of Treatment:

## 2021-12-24 NOTE — DISCHARGE NOTE PROVIDER - HOSPITAL COURSE
56 year old male w/ no PMHX presents to ED s/p fall off back of truck at work onto head, +LOC, +N/V, -AC/AP. Currently N/V under control, mildly hypertensive in ED. Denies back pain, numbness, tingling, syncope, visual disturbances, otorrhea. Complaining of HA, b/l jaw pain with opening mouth. CTH found R frontal calvarial fracture, trace tSAH/SDH and EDH. Trauma eval, cleared. Patient has L knee brace on that he wears d/t long drives as . Neuro intact. Admitted to NSICU.  (21 Dec 2021 13:54)  Pt doing well pt has had multi ct of the brain which remain stable.   PT has a ctv on 12/24 which is neg for thrombosis     56 year old male w/ no PMHX presents to ED s/p fall off back of truck at work onto head, +LOC, +N/V, -AC/AP. Currently N/V under control, mildly hypertensive in ED. Denies back pain, numbness, tingling, syncope, visual disturbances, otorrhea. Complaining of HA, b/l jaw pain with opening mouth. CTH found R frontal calvarial fracture, trace tSAH/SDH and EDH. Trauma eval, cleared. Patient has L knee brace on that he wears d/t long drives as . Neuro intact. Admitted to NSICU.  (21 Dec 2021 13:54)  Pt doing well pt has had multi ct of the brain which remain stable.   PT has a ctv on 12/24 which is neg for thrombosis. This is a traumatic sdh

## 2021-12-24 NOTE — DISCHARGE NOTE NURSING/CASE MANAGEMENT/SOCIAL WORK - PATIENT PORTAL LINK FT
You can access the FollowMyHealth Patient Portal offered by [facility] by registering at the following website: [email or web address]. By joining [employer]’s FollowMyHealth portal, you will also be able to view your health information using other applications (apps) compatible with our system.

## 2021-12-24 NOTE — PROGRESS NOTE ADULT - SUBJECTIVE AND OBJECTIVE BOX
INTERVAL HPI/OVERNIGHT EVENTS:  Pt admitted on 12/22  s.p fall off truck striking head  Pt ct of the brain pos for EDH and fracture    MEDICATIONS  (STANDING):  levETIRAcetam 500 milliGRAM(s) Oral two times a day  senna 2 Tablet(s) Oral at bedtime    MEDICATIONS  (PRN):  acetaminophen     Tablet .. 650 milliGRAM(s) Oral every 6 hours PRN Temp greater or equal to 38C (100.4F), Mild Pain (1 - 3)  hydrALAZINE Injectable 10 milliGRAM(s) IV Push every 3 hours PRN SBP > 140  labetalol Injectable 10 milliGRAM(s) IV Push every 2 hours PRN SBP>140  ondansetron Injectable 4 milliGRAM(s) IV Push every 6 hours PRN Nausea and/or Vomiting  oxyCODONE    IR 5 milliGRAM(s) Oral every 4 hours PRN Moderate Pain (4 - 6)  oxyCODONE    IR 10 milliGRAM(s) Oral every 4 hours PRN Severe Pain (7 - 10)      Allergies    apple (Eye Irritation; Rash)  Orange (Eye Irritation; Rash)  propofol (Anaphylaxis)  Soy (Anaphylaxis)    Intolerances          Vital Signs Last 24 Hrs  T(C): 37.1 (24 Dec 2021 11:45), Max: 37.1 (24 Dec 2021 11:45)  T(F): 98.8 (24 Dec 2021 11:45), Max: 98.8 (24 Dec 2021 11:45)  HR: 55 (24 Dec 2021 11:45) (48 - 68)  BP: 117/73 (24 Dec 2021 11:45) (113/66 - 118/66)  BP(mean): --  RR: 20 (24 Dec 2021 11:45) (20 - 20)  SpO2: 98% (24 Dec 2021 11:45) (95% - 98%) BMI (kg/m2): 29.9 (12-21-21 @ 11:24)      PHYSICAL EXAM  GENERAL: NAD, well-groomed, well-developed  HEAD:  Atraumatic, Normocephalic  EYES: EOMI, PERRLA, conjunctiva and sclera clear  ENMT: No tonsillar erythema, exudates, or enlargement; Moist mucous membranes, Good dentition, No lesions  NECK: Supple, No JVD, Normal thyroid  NERVOUS SYSTEM:  Alert & Oriented X3, Good concentration; Motor Strength 5/5 B/L upper and lower extremities; DTRs 2+ intact and symmetric  CHEST/LUNG: Clear to percussion bilaterally; No rales, rhonchi, wheezing, or rubs  HEART: Regular rate and rhythm; No murmurs, rubs, or gallops  ABDOMEN: Soft, Nontender, Nondistended; Bowel sounds present  EXTREMITIES:  2+ Peripheral Pulses, No clubbing, cyanosis, or edema  LYMPH: No lymphadenopathy noted  SKIN: No rashes or lesions      LABS:                          12.6   11.90 )-----------( 252      ( 23 Dec 2021 04:14 )             38.8     12-23    138  |  101  |  11.4  ----------------------------<  115<H>  3.9   |  24.0  |  0.87    Ca    9.2      23 Dec 2021 04:14  Phos  3.1     12-23  Mg     2.2     12-23          I&O's Detail    RADIOLOGY & ADDITIONAL TESTS:  < from: CT Angio Head w/ IV Cont (12.24.21 @ 04:48) >  ACC: [id number] EXAM:  CT ANGIO BRAIN (W)[facility]                        ACC: [id number] EXAM:  CT BRAIN                        PROCEDURE DATE:  12/24/2021    IMPRESSION:  Stable multicompartmental acute intracranial hemorrhages/contusions  Nondisplaced right paramedian frontal calvarial fracture  Narrowing of the mid superior sagittal sinus with inferior displacement   from the adjacent extra-axial hematoma without significant change. No   venous sinus thrombosis.  Preliminary report provided on 12/24/2021  --- End of Report ---    < end of copied text >

## 2021-12-24 NOTE — DISCHARGE NOTE NURSING/CASE MANAGEMENT/SOCIAL WORK - NSDCPEFALRISK_GEN_ALL_CORE
For information on Fall & Injury Prevention, visit: [email or web address] OR  [email or web address] OR  https://www.cdc.gov/steadi/patient.html

## 2021-12-24 NOTE — DISCHARGE NOTE PROVIDER - CARE PROVIDER_API CALL
Chandler Tracy; PhD)  Neurosurgery  [address] [place] [address]  Phone: [phone]  Fax: [phone]  Follow Up Time: 2 weeks

## 2021-12-24 NOTE — DISCHARGE NOTE PROVIDER - NSDCMRMEDTOKEN_GEN_ALL_CORE_FT
EpiPen 2-Mihir 0.3 mg injectable kit: 0.3 milligram(s) injectable use as directed     disp 1 two mihir  levETIRAcetam 500 mg oral tablet: 1 tab(s) orally 2 times a day MDD:2 tab  oxyCODONE 5 mg oral tablet: 1 tab(s) orally every 6 hours, As Needed -Moderate Pain (4 - 6) MDD:4  predniSONE 20 mg oral tablet: 2 tab(s) orally once a day x 4 days - start tomorrow AM

## 2021-12-24 NOTE — PROGRESS NOTE ADULT - ASSESSMENT
56ym s/p fall with sdu    ct of the brain stable, ctv reviewed and neg for thrombosis    Plan   will discharge pt to home  pt will be given script for keppra x 7 days  pt will need to follow up as an outpatient
ASSESSMENT/PLAN:  56y old Male who presents with a chief complaint of tSDH/SAH/EDH    NEURO:   SBP<160  q2hr neurochecks  repeat CTH for stability  500mg bid keppra  pain mgt: tylenol and oxy 5 prn  Activity: [x] mobilize as tolerated [] Bedrest [x] PT [x] OT [] PMNR    PULM: room air   SpO2>92%  incentive spirometry   OOB    CV:  SBP goal <160    RENAL: monitor I/O  replete lytes prn  voiding  Fluids: IVF while NPO    GI:  Diet: advance diet as tolerated  GI prophylaxis [x] not indicated   zofran prn for nausea  Bowel regimen [x] senna [] other:    ENDO:   Goal euglycemia (-180)    HEME/ONC:  VTE prophylaxis: [] SCDs [x] chemoprophylaxis held 24hrs post-bleed     ID: afebrile   no leukocytosis    MISC:    I affirm that this patient is critically ill and at high risk for sudden, fatal deterioration due to one or more of the above stated active issues.     This time does not include bedside procedures that are documented separately.

## 2022-01-10 ENCOUNTER — APPOINTMENT (OUTPATIENT)
Dept: OTOLARYNGOLOGY | Facility: CLINIC | Age: 57
End: 2022-01-10

## 2022-03-18 ENCOUNTER — APPOINTMENT (OUTPATIENT)
Dept: PEDIATRIC ALLERGY IMMUNOLOGY | Facility: CLINIC | Age: 57
End: 2022-03-18
Payer: MEDICAID

## 2022-03-18 ENCOUNTER — LABORATORY RESULT (OUTPATIENT)
Age: 57
End: 2022-03-18

## 2022-03-18 VITALS
SYSTOLIC BLOOD PRESSURE: 122 MMHG | HEIGHT: 64 IN | BODY MASS INDEX: 32.95 KG/M2 | DIASTOLIC BLOOD PRESSURE: 80 MMHG | TEMPERATURE: 97.16 F | OXYGEN SATURATION: 99 % | WEIGHT: 192.99 LBS | HEART RATE: 70 BPM

## 2022-03-18 DIAGNOSIS — T78.3XXA ANGIONEUROTIC EDEMA, INITIAL ENCOUNTER: ICD-10-CM

## 2022-03-18 DIAGNOSIS — Z78.9 OTHER SPECIFIED HEALTH STATUS: ICD-10-CM

## 2022-03-18 DIAGNOSIS — Z87.891 PERSONAL HISTORY OF NICOTINE DEPENDENCE: ICD-10-CM

## 2022-03-18 PROCEDURE — 99203 OFFICE O/P NEW LOW 30 MIN: CPT

## 2022-03-20 LAB
ALBUMIN SERPL ELPH-MCNC: 5 G/DL
ALP BLD-CCNC: 111 U/L
ALT SERPL-CCNC: 18 U/L
ANION GAP SERPL CALC-SCNC: 9 MMOL/L
AST SERPL-CCNC: 15 U/L
BASOPHILS # BLD AUTO: 0.04 K/UL
BASOPHILS NFR BLD AUTO: 0.8 %
BILIRUB SERPL-MCNC: 0.2 MG/DL
BUN SERPL-MCNC: 15 MG/DL
C4 SERPL-MCNC: 30 MG/DL
CALCIUM SERPL-MCNC: 9.6 MG/DL
CHLORIDE SERPL-SCNC: 105 MMOL/L
CO2 SERPL-SCNC: 25 MMOL/L
CREAT SERPL-MCNC: 1.23 MG/DL
EGFR: 69 ML/MIN/1.73M2
EOSINOPHIL # BLD AUTO: 0.19 K/UL
EOSINOPHIL NFR BLD AUTO: 3.9 %
ERYTHROCYTE [SEDIMENTATION RATE] IN BLOOD BY WESTERGREN METHOD: 12 MM/HR
GLUCOSE SERPL-MCNC: 108 MG/DL
HCT VFR BLD CALC: 45.4 %
HGB BLD-MCNC: 13.9 G/DL
IMM GRANULOCYTES NFR BLD AUTO: 0.2 %
LYMPHOCYTES # BLD AUTO: 2.02 K/UL
LYMPHOCYTES NFR BLD AUTO: 41.9 %
MAN DIFF?: NORMAL
MCHC RBC-ENTMCNC: 27.7 PG
MCHC RBC-ENTMCNC: 30.6 GM/DL
MCV RBC AUTO: 90.6 FL
MONOCYTES # BLD AUTO: 0.44 K/UL
MONOCYTES NFR BLD AUTO: 9.1 %
NEUTROPHILS # BLD AUTO: 2.12 K/UL
NEUTROPHILS NFR BLD AUTO: 44.1 %
PLATELET # BLD AUTO: 287 K/UL
POTASSIUM SERPL-SCNC: 4.5 MMOL/L
PROT SERPL-MCNC: 7.5 G/DL
RBC # BLD: 5.01 M/UL
RBC # FLD: 15.7 %
SODIUM SERPL-SCNC: 140 MMOL/L
TSH SERPL-ACNC: 1.46 UIU/ML
WBC # FLD AUTO: 4.82 K/UL

## 2022-03-21 LAB — C1INH FUNCTIONAL FLD-MCNC: >100

## 2022-03-22 LAB
C1INH SERPL-MCNC: 33 MG/DL
DEPRECATED SOYBEAN IGE RAST QL: NORMAL
SOYBEAN IGE QN: 0.11 KUA/L

## 2022-03-22 NOTE — HISTORY OF PRESENT ILLNESS
[Asthma] : asthma [Eczematous rashes] : eczematous rashes [de-identified] : 56 year old male with no past medical history presented for initial evaluation of angioedema \par \par In early Dec 2021, patient was evaluated by [facility] ED for swelling of airway, facial swelling and hives on his palms. Patient reports that he drank soy milk in the morning. Then on his way to work in his car, he took Zhen's cough drop. right after taking the cough drop, he had throat closing sensation, which progressed to inability to talk and drooling. He reports that he bought allergy medications at a gas station and took 2 pills, but did not improve and drove himself to [facility] ED. \par \par He was given two doses of Epinephrine, Benadryl, famotidine and Decadron. Patient was noted to have significant uvular edema and soft palate edema on exam.  On laryngoscope, was noted to have "generalized erythema throughout larynx, pooling of secretions in oropharynx, +uvular edema. Nasopharynx, oropharynx, and hypopharynx clear, no bleeding. Tongue base, posterior pharyngeal wall, vallecula, epiglottis, and subglottis appear normal. No masses or lesions. Airway patent, no foreign body visualized. No glottic/supraglottic edema. True vocal cords, arytenoids, vestibular folds, ventricles, pyriform sinuses, and aryepiglottic folds appear normal bilaterally. Vocal cords mobile with good contact b/l." \par \par He reports he does not have any history of food allergy prior to this reaction. He has tolerated soy milk and cough drop before this episode. He suspects that the cough drop may have . He is avoiding soy milk since then, but unsure if he ate anything with soy because he does not check food labels. He continues to tolerate the same cough drop after the incident. He does not take any medications and has lived in the same house since . \par \par He denies spontaneous swelling, but recalls that in 2020, he had an episode of painful swelling of his left face. It was initially thought to be a tooth infection, but could not figure out an etiology of the swelling. It resolved with treatment of the tooth.Denies trauma to the area. \par Also reports vague intermittent abdominal pain. \par \par apple and oranges - tingling in lips \par Denies random hives. \par He has seasonal allergy \par \par PMH: None \par PSH: None \par FHX: None \par Med: None \par Allergy: NKDA, no food/environmental allergy \par Social: Denies recreational drugs, or vape use.\par Tried smoking as a teenager. One beer occasionally.

## 2022-03-22 NOTE — REVIEW OF SYSTEMS
[Puffy Eyelids] : puffy ~T eyelids [Abdominal Pain] : abdominal pain [Urticaria] : urticaria [Nl] : Genitourinary [FreeTextEntry4] : throat swelling, drooling

## 2022-03-22 NOTE — PHYSICAL EXAM
[Alert] : alert [Well Nourished] : well nourished [Healthy Appearance] : healthy appearance [No Acute Distress] : no acute distress [Well Developed] : well developed [Normal Pupil & Iris Size/Symmetry] : normal pupil and iris size and symmetry [No Discharge] : no discharge [No Photophobia] : no photophobia [Sclera Not Icteric] : sclera not icteric [Normal Nasal Mucosa] : the nasal mucosa was normal [Normal Lips/Tongue] : the lips and tongue were normal [Normal Outer Ear/Nose] : the ears and nose were normal in appearance [Normal Tonsils] : normal tonsils [No Thrush] : no thrush [Supple] : the neck was supple [Normal Rate and Effort] : normal respiratory rhythm and effort [No Crackles] : no crackles [No Retractions] : no retractions [Bilateral Audible Breath Sounds] : bilateral audible breath sounds [Normal Rate] : heart rate was normal  [Normal S1, S2] : normal S1 and S2 [No murmur] : no murmur [Regular Rhythm] : with a regular rhythm [Skin Intact] : skin intact  [No Rash] : no rash [No Skin Lesions] : no skin lesions [No clubbing] : no clubbing [No Edema] : no edema [No Cyanosis] : no cyanosis [Normal Mood] : mood was normal [Normal Affect] : affect was normal [Alert, Awake, Oriented as Age-Appropriate] : alert, awake, oriented as age appropriate [Conjunctival Erythema] : no conjunctival erythema [Suborbital Bogginess] : no suborbital bogginess (allergic shiners) [Pale mucosa] : no pale mucosa [Boggy Nasal Turbinates] : no boggy and/or pale nasal turbinates [Pharyngeal erythema] : no pharyngeal erythema [Posterior Pharyngeal Cobblestoning] : no posterior pharyngeal cobblestoning [Clear Rhinorrhea] : no clear rhinorrhea was seen [Exudate] : no exudate [Wheezing] : no wheezing was heard

## 2022-03-22 NOTE — CONSULT LETTER
[Dear  ___] : Dear  [unfilled], [Consult Letter:] : I had the pleasure of evaluating your patient, [unfilled]. [Please see my note below.] : Please see my note below. [Consult Closing:] : Thank you very much for allowing me to participate in the care of this patient.  If you have any questions, please do not hesitate to contact me. [Sincerely,] : Sincerely, [FreeTextEntry3] : Cheyenne Mcrae MD\par Fellow, Division of Allergy and Immunology. \par \par Lynda Arrington MD, LIMA, SEAN\par Associate , \par Assistant Fellowship Training ,\par Director, [facility] and [facility]\par Division of Allergy and Immunology\par [facility]\par [facility]\par , Pediatrics and Medicine\par [employer] [facility]\par [address], [address]\[place] [address]\par [phone]\par

## 2022-03-28 LAB — C1Q SERPL-MCNC: 13.6 MG/DL

## 2022-04-01 ENCOUNTER — APPOINTMENT (OUTPATIENT)
Dept: PEDIATRIC ALLERGY IMMUNOLOGY | Facility: CLINIC | Age: 57
End: 2022-04-01

## 2022-04-05 ENCOUNTER — APPOINTMENT (OUTPATIENT)
Dept: NEUROLOGY | Facility: CLINIC | Age: 57
End: 2022-04-05
Payer: MEDICAID

## 2022-04-05 VITALS
HEART RATE: 100 BPM | DIASTOLIC BLOOD PRESSURE: 76 MMHG | WEIGHT: 190 LBS | BODY MASS INDEX: 32.44 KG/M2 | SYSTOLIC BLOOD PRESSURE: 122 MMHG | HEIGHT: 64 IN

## 2022-04-05 DIAGNOSIS — G47.33 OBSTRUCTIVE SLEEP APNEA (ADULT) (PEDIATRIC): ICD-10-CM

## 2022-04-05 PROCEDURE — 99205 OFFICE O/P NEW HI 60 MIN: CPT

## 2022-04-05 RX ORDER — EPINEPHRINE 0.3 MG/.3ML
0.3 INJECTION INTRAMUSCULAR
Qty: 2 | Refills: 0 | Status: ACTIVE | COMMUNITY
Start: 2021-12-08

## 2022-04-05 NOTE — PHYSICAL EXAM
[General Appearance - Alert] : alert [Oriented To Time, Place, And Person] : oriented to person, place, and time [Person] : oriented to person [Place] : oriented to place [Time] : oriented to time [Short Term Intact] : short term memory intact [Fluency] : fluency intact [Current Events] : adequate knowledge of current events [Cranial Nerves Optic (II)] : visual acuity intact bilaterally,  visual fields full to confrontation, pupils equal round and reactive to light [Cranial Nerves Oculomotor (III)] : extraocular motion intact [Cranial Nerves Facial (VII)] : face symmetrical [Cranial Nerves Vestibulocochlear (VIII)] : hearing was intact bilaterally [Cranial Nerves Accessory (XI - Cranial And Spinal)] : head turning and shoulder shrug symmetric [Motor Tone] : muscle tone was normal in all four extremities [Motor Strength] : muscle strength was normal in all four extremities [Sensation Tactile Decrease] : light touch was intact [Abnormal Walk] : normal gait [Coordination - Dysmetria Impaired Finger-to-Nose Bilateral] : not present [1+] : Patella left 1+

## 2022-04-05 NOTE — HISTORY OF PRESENT ILLNESS
[FreeTextEntry1] : 56-year-old gentleman who was referred from the ER (he went to ER for angioedema) for episodes of apnea at night.  Patient suffers from daytime sleepiness and patient snores as per wife.  Patient has no signs of narcolepsy or other sleep disorder.

## 2022-04-05 NOTE — DISCUSSION/SUMMARY
[FreeTextEntry1] : 56-year-old gentleman who was referred from the ER for likely sleep apnea.  Will send to sleep specialist for diagnosis and treatment.\par \par I spent the time noted on the day of this patient encounter preparing for, providing and documenting the above E/M service and counseling and educate patient on differential, workup, disease course, and treatment/management. Education was provided to the patient during this encounter. All questions and concerns were answered and addressed in detail. The patient verbalized understanding and agreed to plan. Patient was advised to continue to monitor for neurologic symptoms and to notify my office or go to the nearest emergency room if there are any changes. Any orders/referrals and communications were provided as well. \par Side effects of the above medications were discussed in detail including but not limited to applicable black box warning and teratogenicity as appropriate. \par Patient was advised to bring previous records to my office. \par \par \par

## 2022-04-18 ENCOUNTER — APPOINTMENT (OUTPATIENT)
Dept: OTOLARYNGOLOGY | Facility: CLINIC | Age: 57
End: 2022-04-18

## 2022-05-05 ENCOUNTER — APPOINTMENT (OUTPATIENT)
Dept: PEDIATRIC ALLERGY IMMUNOLOGY | Facility: CLINIC | Age: 57
End: 2022-05-05

## 2023-06-30 NOTE — ED ADULT NURSE REASSESSMENT NOTE - GLASGOW COMA SCALE: BEST MOTOR RESPONSE
(M6) obeys commands
15
(M6) obeys commands

## 2023-11-09 ENCOUNTER — APPOINTMENT (OUTPATIENT)
Dept: GASTROENTEROLOGY | Facility: CLINIC | Age: 58
End: 2023-11-09
Payer: MEDICAID

## 2023-11-09 VITALS
HEART RATE: 86 BPM | OXYGEN SATURATION: 100 % | WEIGHT: 186 LBS | SYSTOLIC BLOOD PRESSURE: 120 MMHG | BODY MASS INDEX: 30.99 KG/M2 | DIASTOLIC BLOOD PRESSURE: 78 MMHG | TEMPERATURE: 206.78 F | HEIGHT: 65 IN

## 2023-11-09 DIAGNOSIS — Z78.9 OTHER SPECIFIED HEALTH STATUS: ICD-10-CM

## 2023-11-09 DIAGNOSIS — E78.00 PURE HYPERCHOLESTEROLEMIA, UNSPECIFIED: ICD-10-CM

## 2023-11-09 PROCEDURE — 99204 OFFICE O/P NEW MOD 45 MIN: CPT

## 2023-11-09 RX ORDER — SODIUM SULFATE, POTASSIUM SULFATE AND MAGNESIUM SULFATE 1.6; 3.13; 17.5 G/177ML; G/177ML; G/177ML
17.5-3.13-1.6 SOLUTION ORAL
Qty: 2 | Refills: 0 | Status: ACTIVE | COMMUNITY
Start: 2023-11-09 | End: 1900-01-01

## 2023-12-08 ENCOUNTER — APPOINTMENT (OUTPATIENT)
Dept: GASTROENTEROLOGY | Facility: AMBULATORY MEDICAL SERVICES | Age: 58
End: 2023-12-08